# Patient Record
Sex: FEMALE | Race: WHITE | Employment: UNEMPLOYED | URBAN - METROPOLITAN AREA
[De-identification: names, ages, dates, MRNs, and addresses within clinical notes are randomized per-mention and may not be internally consistent; named-entity substitution may affect disease eponyms.]

---

## 2021-04-28 ENCOUNTER — HOSPITAL ENCOUNTER (OUTPATIENT)
Dept: PHYSICAL THERAPY | Age: 71
Discharge: HOME | End: 2021-04-28
Payer: MEDICARE

## 2021-04-28 PROCEDURE — 97162 PT EVAL MOD COMPLEX 30 MIN: CPT

## 2021-04-28 PROCEDURE — 97110 THERAPEUTIC EXERCISES: CPT

## 2021-04-28 PROCEDURE — 97530 THERAPEUTIC ACTIVITIES: CPT

## 2021-04-28 NOTE — PROGRESS NOTES
[facility]  [facility] [address]  [phone] fax  Plan of Care/ Statement of Necessity for Physical Therapy Services    Patient name: Vernelle Sicard Start of Care: 2021   Referral source: Ricky Ascencio* : 1950    Medical Diagnosis: Bilateral hip pain [M25.551, M25.552]  Bilateral shoulder pain [M25.511, M25.512]  Neck pain [M54.2]  Payor: [place] MEDICARE / Plan: VA MEDICARE PART A & B / Product Type: Medicare /  Onset Date: 21    Treatment Diagnosis: bilateral shoulder pain, bilateral hip pain, neck pain   Prior Hospitalization: see medical history Provider#: [id number]   Medications: Verified on Patient summary List    Comorbidities: hepatitis, arthritis, hx of COVID-19 infection in 2020, HTN   Prior Level of Function: functionally independent, living alone in single story home with stairs to room above garage, enjoys yoga      The Plan of Care and following information is based on the information from the initial evaluation. Assessment/ key information: Pt is a pleasant 80 y.o. female who presents with c/o bilateral hip pain, bilateral shoulder pain, and neck pain. The patient reports an intermittent history of right hip pain that was well managed until 2020 when the patient was diagnosed with COVID-19; the patient suffered from intense arthralgias during her bout with COVID-19 that have persisted and limit the patient's function at home. Signs/symptoms at eval consistent with widespread mechanical joint pain 2/2 likely degenerative changes that were exacerbated during COVID-19 onset and persist secondary to decreased activity levels, generalized weakness, and perhaps some nociplastic changes due to biopsychosocial stressors in patient's life.   Functional deficits include: impairded cervical AROM, impaired left shoulder flexion AROM, impaired functional LE strength, and impaired ambulation tolernace. Rehab potential is good due to desire to achieve PLOF. Pt would benefit from skilled PT to address above deficits to improve Pt's function and ability to return to PLOF household tasks with decreased pain and improved activity tolerance. Evaluation Complexity History HIGH Complexity :3+ comorbidities / personal factors will impact the outcome/ POC ; Examination MEDIUM Complexity : 3 Standardized tests and measures addressing body structure, function, activity limitation and / or participation in recreation  ;Presentation MEDIUM Complexity : Evolving with changing characteristics  ; Clinical Decision Making MEDIUM Complexity : FOTO score of 26-74  Overall Complexity Rating: MEDIUM  Problem List: pain affecting function, decrease ROM, decrease strength, edema affecting function, impaired gait/ balance, decrease ADL/ functional abilitiies, decrease activity tolerance, decrease flexibility/ joint mobility and decrease transfer abilities   Treatment Plan may include any combination of the following: Therapeutic exercise, Therapeutic activities, Neuromuscular re-education, Physical agent/modality, Gait/balance training, Manual therapy, Aquatic therapy, Patient education, Self Care training, Functional mobility training, Home safety training and Stair training  Patient / Family readiness to learn indicated by: asking questions  Persons(s) to be included in education: patient (P)  Barriers to Learning/Limitations: None  Patient Goal (s): to be able to walk without severe pain, to regain some strength, and to have a greater range of motion  Patient Self Reported Health Status: fair  Rehabilitation Potential: good    Short Term Goals: To be accomplished in 1 week  - Goal: Pt to be compliant with initial HEP to improve shoulder/lumbar/hip mobility to improve ease of daily tasks. Status at last note/certification: Established and reviewed with Pt  Long Term Goals:  To be accomplished in 10 treatments  - Goal: Pt to demo B cervical rotation of at least 50 degrees to improve ease of visual scanning when driving. Status at last note/certification: cervical rotation right 42 deg, left 32 deg  - Goal: Pt to demo left seated shoulder flexion of at least 150 deg to improve ease of cleaning tasks. Status at last note/certification: left seated shoulder flexion 123 deg  - Goal: Pt will perform 5x STS test in 12 seconds or less without UE assist to improve ease of transfers and demo improved functional LE strength. Status at last note/certification: 24 seconds without UE assist  - Goal: Pt to report ambulation tolerance of at least 15 minutes without increased pain to improve ease of grocery shopping. Status at last note/certification: 3-5 minute tolerance before increased hip pain  - Goal: Pt to report FOTO score of at least 50 pts to show improved function and quality of life. Status at last note/certification: FOTO 39 pts       Frequency / Duration: Patient to be seen 2-3 times per week for 10 treatments. Patient/ Caregiver education and instruction: Diagnosis, prognosis, activity modification and exercises   [x]  Plan of care has been reviewed with PTA    Certification Period: 4/28/21-5/27/21  Maria De Jesus Speaker 4/28/2021 10:13 AM  _____________________________________________________________________  I certify that the above Therapy Services are being furnished while the patient is under my care. I agree with the treatment plan and certify that this therapy is necessary.     [de-identified] Signature:____________Date:_________TIME:________     Kera Hutchins*  ** Signature, Date and Time must be completed for valid certification **    Please sign and return to [facility] JASON MARQUES [address]  [phone] fax

## 2021-04-28 NOTE — PROGRESS NOTES
PT DAILY TREATMENT NOTE 10-18    Patient Name: Scotty Ramon  Date:2021  : 1950  [x]  Patient  Verified  Payor: VA MEDICARE / Plan: VA MEDICARE PART A & B / Product Type: Medicare /    In time:9:08  Out time:10:03  Total Treatment Time (min): 55  Visit #: 1 of 10    Medicare/BCBS Only   Total Timed Codes (min):  25 1:1 Treatment Time:  55       Treatment Area: Bilateral hip pain [M25.551, M25.552]  Bilateral shoulder pain [M25.511, M25.512]  Neck pain [M54.2]    SUBJECTIVE  Pain Level (0-10 scale): 5  Any medication changes, allergies to medications, adverse drug reactions, diagnosis change, or new procedure performed?: [x] No    [] Yes (see summary sheet for update)  Subjective functional status/changes:   [] No changes reported  See POC    OBJECTIVE    30 min [x]Eval                  []Re-Eval       10 min Therapeutic Exercise:  [] See flow sheet :   Rationale: increase ROM and increase strength to improve the patients ability to perform functional reaching/lifting/bed mobility with decreased pain. 15 min Therapeutic Activity:  []  See flow sheet : Patient education on therapy assessment, prognosis, expectations for therapy sessions, patient goals, role of COVID-19 arthralgias and inactivity in generating pain, focus of therapy on function and global strengthening/mobility, and HEP. Rationale: to improve the patients ability to adhere to HEP and therapy sessions for increased compliance when working toward therapy goals.             With   [] TE   [x] TA   [] neuro   [] other: Patient Education: [x] Review HEP    [] Progressed/Changed HEP based on:   [] positioning   [] body mechanics   [] transfers   [] heat/ice application    [] other:      Other Objective/Functional Measures: See POC     Pain Level (0-10 scale) post treatment: 5    ASSESSMENT/Changes in Function: See POC    Patient will continue to benefit from skilled PT services to modify and progress therapeutic interventions, address functional mobility deficits, address ROM deficits, address strength deficits, analyze and address soft tissue restrictions, analyze and cue movement patterns, analyze and modify body mechanics/ergonomics, assess and modify postural abnormalities, address imbalance/dizziness and instruct in home and community integration to attain remaining goals. [x]  See Plan of Care  []  See progress note/recertification  []  See Discharge Summary         Progress towards goals / Updated goals:  See POC    PLAN  [x]  Upgrade activities as tolerated     []  Continue plan of care  [x]  Update interventions per flow sheet       []  Discharge due to:_  []  Other:_      Leatha Old 4/28/2021  10:10 AM    No future appointments.

## 2021-05-04 ENCOUNTER — HOSPITAL ENCOUNTER (OUTPATIENT)
Dept: PHYSICAL THERAPY | Age: 71
Discharge: HOME | End: 2021-05-04
Payer: MEDICARE

## 2021-05-04 PROCEDURE — 97530 THERAPEUTIC ACTIVITIES: CPT

## 2021-05-04 PROCEDURE — 97110 THERAPEUTIC EXERCISES: CPT

## 2021-05-04 NOTE — PROGRESS NOTES
PT DAILY TREATMENT NOTE 10-18    Patient Name: Georgina Clayton  Date:2021  : 1950  [x]  Patient  Verified  Payor: Gerhardt Hinders / Plan: VA MEDICARE PART A & B / Product Type: Medicare /    In time:9:45  Out time:10:33  Total Treatment Time (min): 48  Visit #: 2 of 10    Medicare/BCBS Only   Total Timed Codes (min):  48 1:1 Treatment Time:  43       Treatment Area: Bilateral hip pain [M25.551, M25.552]  Bilateral shoulder pain [M25.511, M25.512]  Neck pain [M54.2]    SUBJECTIVE  Pain Level (0-10 scale): 4  Any medication changes, allergies to medications, adverse drug reactions, diagnosis change, or new procedure performed?: [x] No    [] Yes (see summary sheet for update)  Subjective functional status/changes:   [] No changes reported  \"I have been doing my exercises religiously at home but I am also dealing with a chronic UTI so I was at the doctor yesterday and not able to work on those as much. \"    OBJECTIVE    38 min Therapeutic Exercise:  [x] See flow sheet :   Rationale: increase ROM and increase strength to improve the patients ability to perform ADLs with improved shoulder/elbow/hip/knee strength and improved cervical/lumbar mobility. 10 min Therapeutic Activity:  [x]  See flow sheet :   Rationale: increase ROM, increase strength, improve coordination, improve balance, and increase proprioception  to improve the patients ability to perform transfers, stair negotiation, and functional overhead/forward/floor<>waist lifts.   Patient education: HEP review     With   [x] TE   [] TA   [] neuro   [] other: Patient Education: [x] Review HEP    [] Progressed/Changed HEP based on:   [] positioning   [] body mechanics   [] transfers   [] heat/ice application    [] other:      Other Objective/Functional Measures: -Initiated treatment per flowsheet  - bpm following standing exercises     Pain Level (0-10 scale) post treatment: 0    ASSESSMENT/Changes in Function: The patient requires moderate verbal/visual cuing for correct technique with today's interventions. Able to tolerate all interventions without increased pain though pt notes fatigue following interventions, especially standing exercises. She is encouraged by her ability to complete today's interventions. Patient will continue to benefit from skilled PT services to modify and progress therapeutic interventions, address functional mobility deficits, address ROM deficits, address strength deficits, analyze and address soft tissue restrictions, analyze and cue movement patterns, analyze and modify body mechanics/ergonomics, assess and modify postural abnormalities, address imbalance/dizziness and instruct in home and community integration to attain remaining goals. []  See Plan of Care  []  See progress note/recertification  []  See Discharge Summary         Progress towards goals / Updated goals:  Short Term Goals: To be accomplished in 1 week  - Goal: Pt to be compliant with initial HEP to improve shoulder/lumbar/hip mobility to improve ease of daily tasks. Status at last note/certification: Established and reviewed with Pt  Current: met, pt reports daily compliance  Long Term Goals: To be accomplished in 10 treatments  - Goal: Pt to demo B cervical rotation of at least 50 degrees to improve ease of visual scanning when driving. Status at last note/certification: cervical rotation right 42 deg, left 32 deg  Current:            - Goal: Pt to demo left seated shoulder flexion of at least 150 deg to improve ease of cleaning tasks. Status at last note/certification: left seated shoulder flexion 123 deg  Current:            - Goal: Pt will perform 5x STS test in 12 seconds or less without UE assist to improve ease of transfers and demo improved functional LE strength.   Status at last note/certification: 24 seconds without UE assist  Current:            - Goal: Pt to report ambulation tolerance of at least 15 minutes without increased pain to improve ease of grocery shopping. Status at last note/certification: 3-5 minute tolerance before increased hip pain  Current:            - Goal: Pt to report FOTO score of at least 50 pts to show improved function and quality of life.   Status at last note/certification: FOTO 39 pts   Current: reassess at MD note    PLAN  [x]  Upgrade activities as tolerated     [x]  Continue plan of care  []  Update interventions per flow sheet       []  Discharge due to:_  []  Other:_      Marion Bishop 5/4/2021  7:23 AM    Future Appointments   Date Time Provider Davis Olivas   5/4/2021  9:45 AM [facility] [address]   5/7/2021  9:00 AM August Osullivan, [facility] [address]   5/12/2021  9:00 AM August Osullivan, [facility] [address]   5/14/2021  9:00 AM August Osullivan, [facility] [address]   5/18/2021  9:45 AM [facility] [address]   5/21/2021  9:00 AM [facility] [address]   5/25/2021  9:45 AM [facility] [address]   5/28/2021  9:45 [facility] [address]

## 2021-05-07 ENCOUNTER — HOSPITAL ENCOUNTER (OUTPATIENT)
Dept: PHYSICAL THERAPY | Age: 71
Discharge: HOME | End: 2021-05-07
Payer: MEDICARE

## 2021-05-07 PROCEDURE — 97112 NEUROMUSCULAR REEDUCATION: CPT

## 2021-05-07 PROCEDURE — 97110 THERAPEUTIC EXERCISES: CPT

## 2021-05-07 NOTE — PROGRESS NOTES
PT DAILY TREATMENT NOTE     Patient Name: Franky Ochoa  Date:2021  : 1950  [x]  Patient  Verified  Payor: VA MEDICARE / Plan: VA MEDICARE PART A & B / Product Type: Medicare /    In time:9:00  Out time:9:53  Total Treatment Time (min): 53  Visit #: 3 of 10    Medicare/BCBS Only   Total Timed Codes (min):  53 1:1 Treatment Time:  38       Treatment Area: Bilateral hip pain [M25.551, M25.552]  Bilateral shoulder pain [M25.511, M25.512]  Neck pain [M54.2]    SUBJECTIVE  Pain Level (0-10 scale): 4/10  Any medication changes, allergies to medications, adverse drug reactions, diagnosis change, or new procedure performed?: [x] No    [] Yes (see summary sheet for update)  Subjective functional status/changes:   [] No changes reported  \"I was a little sore after the first session but not as much as I thought. I'm very tired. \"    OBJECTIVE    43 min Therapeutic Exercise:  [] See flow sheet :   Rationale: increase ROM and increase strength to improve the patients ability to improve B shoulder mobility, LE strength for ease of ADLs, standing activities     10 min Neuromuscular Re-education:  []  See flow sheet :   Rationale: increase strength and improve coordination  to improve the patients ability to improve hip strength and stability for increased standing/amb tolerance, improved standing balance          With   [] TE   [] TA   [] neuro   [] other: Patient Education: [x] Review HEP    [] Progressed/Changed HEP based on:   [] positioning   [] body mechanics   [] transfers   [] heat/ice application    [] other:      Other Objective/Functional Measures: Continued with exercises per flow sheet. Pain Level (0-10 scale) post treatment: 5/10 \"sore\"    ASSESSMENT/Changes in Function: Pt gives good effort during therapy sessions and was able to perform all exercise interventions without increased pain overall.   Pt most challenged with theraband Y's and S/L open books but ROM continued to improve with each repetition. Pt noted muscle soreness at end of session only. Patient will continue to benefit from skilled PT services to address functional mobility deficits, address ROM deficits, address strength deficits, analyze and address soft tissue restrictions, analyze and cue movement patterns, analyze and modify body mechanics/ergonomics, assess and modify postural abnormalities and instruct in home and community integration to attain remaining goals. []  See Plan of Care  []  See progress note/recertification  []  See Discharge Summary         Progress towards goals / Updated goals:  Short Term Goals: To be accomplished in 1 week  - Goal: Pt to be compliant with initial HEP to improve shoulder/lumbar/hip mobility to improve ease of daily tasks. Status at last note/certification: Established and reviewed with Pt  Current: met, pt reports daily compliance  Long Term Goals: To be accomplished in 10 treatments  - Goal: Pt to demo B cervical rotation of at least 50 degrees to improve ease of visual scanning when driving. Status at last note/certification: cervical rotation right 42 deg, left 32 deg  Current:            - Goal: Pt to demo left seated shoulder flexion of at least 150 deg to improve ease of cleaning tasks.   Status at last note/certification: left seated shoulder flexion 123 deg  Current:            - Goal: Pt will perform 5x STS test in 12 seconds or less without UE assist to improve ease of transfers and demo improved functional LE strength. Status at last note/certification: 24 seconds without UE assist  Current:            - Goal: Pt to report ambulation tolerance of at least 15 minutes without increased pain to improve ease of grocery shopping. Status at last note/certification: 3-5 minute tolerance before increased hip pain  Current:            - Goal: Pt to report FOTO score of at least 50 pts to show improved function and quality of life.   Status at last note/certification: FOTO 39 pts   Current: reassess at MD note    PLAN  [x]  Upgrade activities as tolerated     [x]  Continue plan of care  []  Update interventions per flow sheet       []  Discharge due to:_  []  Other:_      Sarbjit Osullivan, PT 5/7/2021  9:19 AM    Future Appointments   Date Time Provider Davis Olivas   5/12/2021  9:00 AM Sarbjit Osullivan, PT [facility]   5/14/2021  9:00 AM Sarbjit Osullivan, PT [facility]   5/18/2021  9:45 AM [facility]   5/21/2021  [facility]   5/25/2021  9:45 AM [facility]   5/28/2021  9:45 AM Roland Bennett, [facility]

## 2021-05-12 ENCOUNTER — HOSPITAL ENCOUNTER (OUTPATIENT)
Dept: PHYSICAL THERAPY | Age: 71
Discharge: HOME | End: 2021-05-12
Payer: MEDICARE

## 2021-05-12 PROCEDURE — 97110 THERAPEUTIC EXERCISES: CPT

## 2021-05-12 PROCEDURE — 97112 NEUROMUSCULAR REEDUCATION: CPT

## 2021-05-12 NOTE — PROGRESS NOTES
PT DAILY TREATMENT NOTE     Patient Name: Vernelle Sicard  Date:2021  : 1950  [x]  Patient  Verified  Payor: VA MEDICARE / Plan: VA MEDICARE PART A & B / Product Type: Medicare /    In time:09:02 Out time:10:00  Total Treatment Time (min): 58  Visit #: 4 of 10    Medicare/BCBS Only   Total Timed Codes (min):  58 1:1 Treatment Time:  56       Treatment Area: Bilateral hip pain [M25.551, M25.552]  Bilateral shoulder pain [M25.511, M25.512]  Neck pain [M54.2]    SUBJECTIVE  Pain Level (0-10 scale): 5/10  Any medication changes, allergies to medications, adverse drug reactions, diagnosis change, or new procedure performed?: [x] No    [] Yes (see summary sheet for update)  Subjective functional status/changes:   [] No changes reported  \"A little better today\" \"Left shoulder and legs were hurting really bad\"    OBJECTIVE    30 min Therapeutic Exercise:  [x] See flow sheet :   Rationale: increase ROM and increase strength to improve the patients ability to improve B shoulder mobility, LE strength for ease of ADLs, standing activities     28/ (1:1) min Neuromuscular Re-education:  [x]  See flow sheet :   Rationale: increase strength and improve coordination  to improve the patients ability to improve hip strength and stability for increased standing/amb tolerance, improved standing balance          With   [x] TE   [] TA   [] neuro   [] other: Patient Education: [x] Review HEP    [] Progressed/Changed HEP based on:   [] positioning   [] body mechanics   [] transfers   [] heat/ice application    [x] other: updated HEP, GTB, and OTB     Other Objective/Functional Measures: Continued with exercises per flow sheet. Pain Level (0-10 scale) post treatment: 3    ASSESSMENT/Changes in Function: Pt tolerated exercises and repetition progressions without increased pain or discomfort.  Provided with and discussed updated HEP with therex performed in PT session and GTB and OTB for HEP; verbalized and demonstrates understanding. Skilled cues provided for proper hip hinge with mini squats at parallel bars. Patient will continue to benefit from skilled PT services to address functional mobility deficits, address ROM deficits, address strength deficits, analyze and address soft tissue restrictions, analyze and cue movement patterns, analyze and modify body mechanics/ergonomics, assess and modify postural abnormalities and instruct in home and community integration to attain remaining goals. []  See Plan of Care  []  See progress note/recertification  []  See Discharge Summary         Progress towards goals / Updated goals:  Short Term Goals: To be accomplished in 1 week  - Goal: Pt to be compliant with initial HEP to improve shoulder/lumbar/hip mobility to improve ease of daily tasks. Status at last note/certification: Established and reviewed with Pt  Current: met, pt reports daily compliance  Long Term Goals: To be accomplished in 10 treatments  - Goal: Pt to demo B cervical rotation of at least 50 degrees to improve ease of visual scanning when driving. Status at last note/certification: cervical rotation right 42 deg, left 32 deg  Current:            - Goal: Pt to demo left seated shoulder flexion of at least 150 deg to improve ease of cleaning tasks.   Status at last note/certification: left seated shoulder flexion 123 deg  Current:            - Goal: Pt will perform 5x STS test in 12 seconds or less without UE assist to improve ease of transfers and demo improved functional LE strength. Status at last note/certification: 24 seconds without UE assist  Current:            - Goal: Pt to report ambulation tolerance of at least 15 minutes without increased pain to improve ease of grocery shopping.   Status at last note/certification: 3-5 minute tolerance before increased hip pain  Current: 1 block away when it starts to hurt; progressing            - Goal: Pt to report FOTO score of at least 50 pts to show improved function and quality of life.   Status at last note/certification: FDYV 59 JCV   Current: reassess at MD note    PLAN  [x]  Upgrade activities as tolerated     [x]  Continue plan of care  []  Update interventions per flow sheet       []  Discharge due to:_  []  Other:_      Bibi Silveira, PT 5/12/2021 1004 am    Future Appointments   Date Time Provider Davis Olivas   5/14/2021  9:00 AM Anali Osullivan, [facility]   5/18/2021  9:45 AM Shelby Mettle [facility]   5/21/2021  9:00 AM Lenora Rodgers [facility]   5/25/2021  9:45 AM [facility]   5/28/2021  9:45 AM Doni Read, PT [facility]

## 2021-05-14 ENCOUNTER — HOSPITAL ENCOUNTER (OUTPATIENT)
Dept: PHYSICAL THERAPY | Age: 71
Discharge: HOME | End: 2021-05-14
Payer: MEDICARE

## 2021-05-14 PROCEDURE — 97112 NEUROMUSCULAR REEDUCATION: CPT

## 2021-05-14 PROCEDURE — 97110 THERAPEUTIC EXERCISES: CPT

## 2021-05-14 NOTE — PROGRESS NOTES
PT DAILY TREATMENT NOTE     Patient Name: Franky Ochoa  Date:2021  : 1950  [x]  Patient  Verified  Payor: [place] MED[place]ARE / Plan: [place] MEDICARE PART A & B / Product Type: Medicare /    In time:9:06 Out time:9:57  Total Treatment Time (min):51   Visit #: 5 of 10    Medicare/BCBS Only   Total Timed Codes (min):  41 1:1 Treatment Time:  41       Treatment Area: Bilateral hip pain [M25.551, M25.552]  Bilateral shoulder pain [M25.511, M25.512]  Neck pain [M54.2]    SUBJECTIVE  Pain Level (0-10 scale): 4/10  Any medication changes, allergies to medications, adverse drug reactions, diagnosis change, or new procedure performed?: [x] No    [] Yes (see summary sheet for update)  Subjective functional status/changes:   [] No changes reported  \"I feel it in my left shoulder and legs. \"    OBJECTIVE    Modality rationale: decrease pain and increase tissue extensibility to improve the patients ability to increase ease of ADLs   Min Type Additional Details    [] Estim:  []Unatt       []IFC  []Premod                        []Other:  []w/ice   []w/heat  Position:  Location:    [] Estim: []Att    []TENS instruct  []NMES                    []Other:  []w/US   []w/ice   []w/heat  Position:  Location:    []  Traction: [] Cervical       []Lumbar                       [] Prone          []Supine                       []Intermittent   []Continuous Lbs:  [] before manual  [] after manual    []  Ultrasound: []Continuous   [] Pulsed                           []1MHz   []3MHz Location:  W/cm2:    []  Iontophoresis with dexamethasone         Location: [] Take home patch   [] In clinic   10 []  Ice     [x]  heat  []  Ice massage  []  Laser   []  Anodyne Position: supine  Location: left shoulder    []  Laser with stim  []  Other: Position:  Location:    []  Vasopneumatic Device Pressure:       [] lo [] med [] hi   Temperature: [] lo [] med [] hi   [x] Skin assessment post-treatment:  [x]intact []redness- no adverse reaction []redness  adverse reaction:     26 min Therapeutic Exercise:  [x] See flow sheet :   Rationale: increase ROM and increase strength to improve the patients ability to improve B shoulder mobility, LE strength for ease of ADLs, standing activities     15 min Neuromuscular Re-education:  [x]  See flow sheet :   Rationale: increase strength and improve coordination  to improve the patients ability to improve hip strength and stability for increased standing/amb tolerance, improved standing balance          With   [x] TE   [] TA   [] neuro   [] other: Patient Education: [x] Review HEP    [] Progressed/Changed HEP based on:   [] positioning   [] body mechanics   [] transfers   [] heat/ice application    [x] other: updated HEP, GTB, and OTB     Other Objective/Functional Measures: Continued with exercises per flow sheet. Pain Level (0-10 scale) post treatment: 1/10    ASSESSMENT/Changes in Function: Pt a little more fatigued today with exercises, needing short breaks after each exercise. Pt at end of session noted pushing left shoulder a little harder with exercises today as she is wanting to get better faster. She didn't complain of any increased pain during session. Applied MHP to assist with reduction in left shoulder irritation. Pt noted decrease in symptoms. Pt advised to not pace self with exercises and to not over-exert herself. Pt reported understanding. Patient will continue to benefit from skilled PT services to address functional mobility deficits, address ROM deficits, address strength deficits, analyze and address soft tissue restrictions, analyze and cue movement patterns, analyze and modify body mechanics/ergonomics, assess and modify postural abnormalities and instruct in home and community integration to attain remaining goals. []  See Plan of Care  []  See progress note/recertification  []  See Discharge Summary         Progress towards goals / Updated goals:  Short Term Goals:  To be accomplished in 1 week  - Goal: Pt to be compliant with initial HEP to improve shoulder/lumbar/hip mobility to improve ease of daily tasks. Status at last note/certification: Established and reviewed with Pt  Current: met, pt reports daily compliance  Long Term Goals: To be accomplished in 10 treatments  - Goal: Pt to demo B cervical rotation of at least 50 degrees to improve ease of visual scanning when driving. Status at last note/certification: cervical rotation right 42 deg, left 32 deg  Current: reassess next visit    - Goal: Pt to demo left seated shoulder flexion of at least 150 deg to improve ease of cleaning tasks.   Status at last note/certification: left seated shoulder flexion 123 deg  Current: progressing - raises arm with wand to ~140 deg but with some discomfort           - Goal: Pt will perform 5x STS test in 12 seconds or less without UE assist to improve ease of transfers and demo improved functional LE strength. Status at last note/certification: 24 seconds without UE assist  Current: reassess next visit     - Goal: Pt to report ambulation tolerance of at least 15 minutes without increased pain to improve ease of grocery shopping. Status at last note/certification: 3-5 minute tolerance before increased hip pain  Current: progressing - one block away when it starts to hurt; progressing            - Goal: Pt to report FOTO score of at least 50 pts to show improved function and quality of life.   Status at last note/certification: CQXA 41 WCU   Current: reassess at MD note    PLAN  [x]  Upgrade activities as tolerated     [x]  Continue plan of care  []  Update interventions per flow sheet       []  Discharge due to:_  []  Other:_      Shelly Osullivan, PT 5/14/2021 1004 am    Future Appointments   Date Time Provider Davis Olivas   5/18/2021  9:45 AM Juancarlos Meraz [facility]   5/21/2021  9:00 AM Geoffrey Reynolds [facility]   5/25/2021  9:45 AM Jose Byrd 5/28/2021  9:45 AM Max Schulte, PT [facility]

## 2021-05-18 ENCOUNTER — HOSPITAL ENCOUNTER (OUTPATIENT)
Dept: PHYSICAL THERAPY | Age: 71
Discharge: HOME | End: 2021-05-18
Payer: MEDICARE

## 2021-05-18 PROCEDURE — 97530 THERAPEUTIC ACTIVITIES: CPT

## 2021-05-18 PROCEDURE — 97110 THERAPEUTIC EXERCISES: CPT

## 2021-05-18 NOTE — PROGRESS NOTES
PT DAILY TREATMENT NOTE 10-18    Patient Name: Ivan García  Date:2021  : 1950  [x]  Patient  Verified  Payor: Christopher Schwartz / Plan: [place] MEDICARE PART A & B / Product Type: Medicare /    In time:9:45  Out time:10:45  Total Treatment Time (min): 60  Visit #: 6 of 10    Medicare/BCBS Only   Total Timed Codes (min):  50 1:1 Treatment Time:  45       Treatment Area: Bilateral hip pain [M25.551, M25.552]  Bilateral shoulder pain [M25.511, M25.512]  Neck pain [M54.2]    SUBJECTIVE  Pain Level (0-10 scale): 5  Any medication changes, allergies to medications, adverse drug reactions, diagnosis change, or new procedure performed?: [x] No    [] Yes (see summary sheet for update)  Subjective functional status/changes:   [] No changes reported  \"I am having an easier time reaching straight up at home but it is still difficult to reach sideways. I am having some increased pain in my upper right hip today as well. \"    OBJECTIVE    Modality rationale: decrease pain to improve the patients ability to relax and sleep following therapy session to improve restorative sleep patterns.     Min Type Additional Details    [x] Estim:  []Unatt       []IFC  []Premod                        []Other:  []w/ice   []w/heat  Position:   Location:     [] Estim: []Att    []TENS instruct  []NMES                    []Other:  []w/US   []w/ice   []w/heat  Position:  Location:    []  Traction: [] Cervical       []Lumbar                       [] Prone          []Supine                       []Intermittent   []Continuous Lbs:  [] before manual  [] after manual    []  Ultrasound: []Continuous   [] Pulsed                           []1MHz   []3MHz W/cm2:  Location:    []  Iontophoresis with dexamethasone         Location: [] Take home patch   [] In clinic   10 []  Ice     [x]  heat  []  Ice massage  []  Laser   []  Anodyne Position: left sidelying  Location: right hip    []  Laser with stim  []  Other:  Position:  Location:    [] Vasopneumatic Device Pressure:       [] lo [] med [] hi   Temperature: [] lo [] med [] hi   [x] Skin assessment post-treatment:  [x]intact []redness- no adverse reaction    []redness  adverse reaction:       35 min Therapeutic Exercise:  [x]? See flow sheet :   Rationale: increase ROM and increase strength to improve the patients ability to perform ADLs with improved shoulder/elbow/hip/knee strength and improved cervical/lumbar mobility.    15 min Therapeutic Activity:  [x]? See flow sheet :   Rationale: increase ROM, increase strength, improve coordination, improve balance, and increase proprioception  to improve the patients ability to perform transfers, stair negotiation, and functional overhead/forward/floor<>waist lifts. Patient education: squat technique    With   [] TE   [] TA   [] neuro   [] other: Patient Education: [x] Review HEP    [] Progressed/Changed HEP based on:   [] positioning   [] body mechanics   [] transfers   [] heat/ice application    [] other:      Other Objective/Functional Measures: Seated left GHJ flexion 146 deg     Pain Level (0-10 scale) post treatment: 3    ASSESSMENT/Changes in Function: Patient requires verbal/visual cues to improve posterior hip translation and increased weight shift to heels with mini squats. Pt reporting increased fatigue in right abdominal oblique musculature that is likely secondary to compensation during gait due to weak right hip abductors. Pt making good progress toward improved ease of transfers and overhead reaching abilities.     Patient will continue to benefit from skilled PT services to modify and progress therapeutic interventions, address functional mobility deficits, address ROM deficits, address strength deficits, analyze and address soft tissue restrictions, analyze and cue movement patterns, analyze and modify body mechanics/ergonomics, assess and modify postural abnormalities, address imbalance/dizziness and instruct in home and community integration to attain remaining goals. []  See Plan of Care  []  See progress note/recertification  []  See Discharge Summary         Progress towards goals / Updated goals:  Short Term Goals: To be accomplished in 1 week  - Goal: Pt to be compliant with initial HEP to improve shoulder/lumbar/hip mobility to improve ease of daily tasks. Status at last note/certification: Established and reviewed with Pt  Current: met, pt reports daily compliance  Long Term Goals: To be accomplished in 10 treatments  - Goal: Pt to demo B cervical rotation of at least 50 degrees to improve ease of visual scanning when driving. Status at last note/certification: cervical rotation right 42 deg, left 32 deg  Current: reassess next visit    - Goal: Pt to demo left seated shoulder flexion of at least 150 deg to improve ease of cleaning tasks.   Status at last note/certification: left seated shoulder flexion 123 deg  Current: progressing, 146 degrees with minimal pain at end range (5/18/21)      - Goal: Pt will perform 5x STS test in 12 seconds or less without UE assist to improve ease of transfers and demo improved functional LE strength. Status at last note/certification: 24 seconds without UE assist  Current: progressing, 13 seconds without UE assist (5/18/21)    - Goal: Pt to report ambulation tolerance of at least 15 minutes without increased pain to improve ease of grocery shopping. Status at last note/certification: 3-5 minute tolerance before increased hip pain  Current: progressing - one block away when it starts to hurt; progressing            - Goal: Pt to report FOTO score of at least 50 pts to show improved function and quality of life.   Status at last note/certification: ZUMV 17 SBA   Current: reassess at MD note    PLAN  [x]  Upgrade activities as tolerated     [x]  Continue plan of care  []  Update interventions per flow sheet       []  Discharge due to:_  []  Other:_      Vargas Latosha 5/18/2021  7:42 AM    Future Appointments   Date Time Provider Department Center   5/18/2021  9:45 AM [facility]   5/21/2021  9:00 AM [facility]   5/25/2021  9:45 AM [facility]   5/28/2021  9:45 AM ADI Ta

## 2021-05-21 ENCOUNTER — HOSPITAL ENCOUNTER (OUTPATIENT)
Dept: PHYSICAL THERAPY | Age: 71
Discharge: HOME | End: 2021-05-21
Payer: MEDICARE

## 2021-05-21 PROCEDURE — 97110 THERAPEUTIC EXERCISES: CPT

## 2021-05-21 PROCEDURE — 97530 THERAPEUTIC ACTIVITIES: CPT

## 2021-05-21 NOTE — PROGRESS NOTES
PT DAILY TREATMENT NOTE 10-18    Patient Name: Ivan García  Date:2021  : 1950  [x]  Patient  Verified  Payor: [place] MEDICARE / Plan: [place] MEDICARE PART A & B / Product Type: Medicare /    In time:9:48  Out time:10:31  Total Treatment Time (min): 43  Visit #: 7 of 10    Medicare/BCBS Only   Total Timed Codes (min):  43 1:1 Treatment Time:  40       Treatment Area: Bilateral hip pain [M25.551, M25.552]  Bilateral shoulder pain [M25.511, M25.512]  Neck pain [M54.2]    SUBJECTIVE  Pain Level (0-10 scale): 5  Any medication changes, allergies to medications, adverse drug reactions, diagnosis change, or new procedure performed?: [x] No    [] Yes (see summary sheet for update)  Subjective functional status/changes:   [] No changes reported  \"I was pretty sore Wednesday and yesterday but I am feeling better today. \"    OBJECTIVE    31 min Therapeutic Exercise:  [x]? ? See flow sheet :   Rationale: increase ROM and increase strength to improve the patients ability to perform ADLs with improved shoulder/elbow/hip/knee strength and improved cervical/lumbar mobility.     12 min Therapeutic Activity:  [x]? ?  See flow sheet :   Rationale: increase ROM, increase strength, improve coordination, improve balance, and increase proprioception  to improve the patients ability to perform transfers, stair negotiation, and functional overhead/forward/floor<>waist lifts.   Patient education: squat technique           With   [x] TE   [x] TA   [] neuro   [] other: Patient Education: [x] Review HEP    [] Progressed/Changed HEP based on:   [] positioning   [] body mechanics   [] transfers   [] heat/ice application    [] other:      Other Objective/Functional Measures: -Added standing march with weight to practice donning/doffing pants     Pain Level (0-10 scale) post treatment: 4    ASSESSMENT/Changes in Function: Patient's reports of increased discomfort following previous session consistent with delayed onset muscle soreness. One of her main therapy goals at this time is to be able to don/doff her pants without sitting down. Patient will continue to benefit from skilled PT services to modify and progress therapeutic interventions, address functional mobility deficits, address ROM deficits, address strength deficits, analyze and address soft tissue restrictions, analyze and cue movement patterns, analyze and modify body mechanics/ergonomics, assess and modify postural abnormalities, address imbalance/dizziness and instruct in home and community integration to attain remaining goals. []  See Plan of Care  []  See progress note/recertification  []  See Discharge Summary         Progress towards goals / Updated goals:  Short Term Goals: To be accomplished in 1 week  - Goal: Pt to be compliant with initial HEP to improve shoulder/lumbar/hip mobility to improve ease of daily tasks. Status at last note/certification: Established and reviewed with Pt  Current: met, pt reports daily compliance  Long Term Goals: To be accomplished in 10 treatments  - Goal: Pt to demo B cervical rotation of at least 50 degrees to improve ease of visual scanning when driving. Status at last note/certification: cervical rotation right 42 deg, left 32 deg  Current: progressing, right rotation 62 deg left rotation 42 deg,   - Goal: Pt to demo left seated shoulder flexion of at least 150 deg to improve ease of cleaning tasks.   Status at last note/certification: left seated shoulder flexion 123 deg  Current: progressing, 146 degrees with minimal pain at end range (5/18/21)      - Goal: Pt will perform 5x STS test in 12 seconds or less without UE assist to improve ease of transfers and demo improved functional LE strength.   Status at last note/certification: 24 seconds without UE assist  Current: progressing, 13 seconds without UE assist (5/18/21)    - Goal: Pt to report ambulation tolerance of at least 15 minutes without increased pain to improve ease of grocery shopping. Status at last note/certification: 3-5 minute tolerance before increased hip pain  Current: progressing - one block away when it starts to hurt; progressing            - Goal: Pt to report FOTO score of at least 50 pts to show improved function and quality of life.   Status at last note/certification: AUNP 86 XWB   Current: reassess at MD note    PLAN  [x]  Upgrade activities as tolerated     [x]  Continue plan of care  []  Update interventions per flow sheet       []  Discharge due to:_  []  Other:_      Inge Camarena 5/21/2021  8:14 AM    Future Appointments   Date Time Provider Davis Olivas   5/21/2021  9:45 AM Osiris Colin [facility]   5/25/2021  9:45 AM José Cantu   5/28/2021  9:45 AM Rosalba Burrows, PT [facility]

## 2021-05-25 ENCOUNTER — HOSPITAL ENCOUNTER (OUTPATIENT)
Dept: PHYSICAL THERAPY | Age: 71
Discharge: HOME | End: 2021-05-25
Payer: MEDICARE

## 2021-05-25 PROCEDURE — 97110 THERAPEUTIC EXERCISES: CPT

## 2021-05-25 PROCEDURE — 97112 NEUROMUSCULAR REEDUCATION: CPT

## 2021-05-25 PROCEDURE — 97530 THERAPEUTIC ACTIVITIES: CPT

## 2021-05-25 NOTE — PROGRESS NOTES
PT DAILY TREATMENT NOTE     Patient Name: Kalia Brownlee  Date:2021  : 1950  [x]  Patient  Verified  Payor: Radha James / Plan: [place] MEDICARE PART A & B / Product Type: Medicare /    In time:945  Out time:1030  Total Treatment Time (min): 45  Visit #: 8 of 10    Medicare/BCBS Only   Total Timed Codes (min):  45 1:1 Treatment Time:  45       Treatment Area: Bilateral hip pain [M25.551, M25.552]  Bilateral shoulder pain [M25.511, M25.512]  Neck pain [M54.2]    SUBJECTIVE  Pain Level (0-10 scale): 5  Any medication changes, allergies to medications, adverse drug reactions, diagnosis change, or new procedure performed?: [x] No    [] Yes (see summary sheet for update)  Subjective functional status/changes:   [] No changes reported  \"My left shoulder has been hurting me more. \"    OBJECTIVE    Modality rationale: patient declined   Min Type Additional Details    [] Estim:  []Unatt       []IFC  []Premod                        []Other:  []w/ice   []w/heat  Position:  Location:    [] Estim: []Att    []TENS instruct  []NMES                    []Other:  []w/US   []w/ice   []w/heat  Position:  Location:    []  Traction: [] Cervical       []Lumbar                       [] Prone          []Supine                       []Intermittent   []Continuous Lbs:  [] before manual  [] after manual    []  Ultrasound: []Continuous   [] Pulsed                           []1MHz   []3MHz W/cm2:  Location:    []  Iontophoresis with dexamethasone         Location: [] Take home patch   [] In clinic    []  Ice     []  heat  []  Ice massage  []  Laser   []  Anodyne Position:  Location:    []  Laser with stim  []  Other:  Position:  Location:    []  Vasopneumatic Device Pressure:       [] lo [] med [] hi   Temperature: [] lo [] med [] hi   [] Skin assessment post-treatment:  []intact []redness- no adverse reaction    []redness  adverse reaction:     15 min Therapeutic Exercise:  [x] See flow sheet :   Rationale: increase ROM and increase strength to improve the patients ability to perform ADLs    20 min Therapeutic Activity:  [x]  See flow sheet : functional reaching activities, squatting mechanics, FOTO, reassessment     Rationale: increase ROM, increase strength, improve coordination, improve balance and increase proprioception  to improve the patients ability to improve mobility and reaching      10 min Neuromuscular Re-education:  [x]  See flow sheet : scap re-ed activities, hip/glut re-ed activities    Rationale: increase ROM, increase strength, improve coordination, improve balance and increase proprioception  to improve the patients ability to improve mobility, stance stability, gait, and reaching        With   [x] TE   [x] TA   [x] neuro   [] other: Patient Education: [x] Review HEP    [] Progressed/Changed HEP based on:   [x] positioning   [x] body mechanics   [] transfers   [] heat/ice application    [] other:      Other Objective/Functional Measures:   FOTO 47    AROM right c/s rotation 63 deg  AROM left c/s rotation 60 deg    AROM left shoulder flexion seated 140 deg    5x sit to stand: 12 seconds     Pain Level (0-10 scale) post treatment: 4    ASSESSMENT/Changes in Function: Ms. Alfredito Faye reports 25% improvement since beginning therapy. Pt demonstrates increased AROM cervical rotation and left shoulder flexion. Still lacking end range shoulder flexion on the left and reports recent increase in pain with abduction. Demonstrates good squatting mechanics. Reports an increase in activity tolerance, but limited to up to 10 minutes of walking tolerance prior to onset of pain. We will continue with PT to address her remaining functional deficits.      Patient will continue to benefit from skilled PT services to modify and progress therapeutic interventions, address functional mobility deficits, address ROM deficits, address strength deficits, analyze and address soft tissue restrictions, analyze and cue movement patterns, analyze and modify body mechanics/ergonomics, assess and modify postural abnormalities, address imbalance/dizziness and instruct in home and community integration to attain remaining goals. [x]  See Plan of Care  [x]  See progress note/recertification  []  See Discharge Summary         Progress towards goals / Updated goals:  Short Term Goals: To be accomplished in 1 week  - Goal: Pt to be compliant with initial HEP to improve shoulder/lumbar/hip mobility to improve ease of daily tasks. Status at last note/certification: Established and reviewed with Pt   MET; reports daily compliance  Long Term Goals: To be accomplished in 10 treatments  - Goal: Pt to demo B cervical rotation of at least 50 degrees to improve ease of visual scanning when driving. Status at last note/certification: cervical rotation right 42 deg, left 32 deg   MET; right 63 deg, left 60 deg   - Goal: Pt to demo left seated shoulder flexion of at least 150 deg to improve ease of cleaning tasks.    Status at last note/certification: left seated shoulder flexion 123 deg   PROGRESSING; 140 deg left seated shoulder flexion     - Goal: Pt will perform 5x STS test in 12 seconds or less without UE assist to improve ease of transfers and demo improved functional LE strength. Status at last note/certification: 24 seconds without UE assist   MET; 12 seconds  - Goal: Pt to report ambulation tolerance of at least 15 minutes without increased pain to improve ease of grocery shopping. Status at last note/certification: 3-5 minute tolerance before increased hip pain   PROGRESSING; 10 minutes prior to pain            - Goal: Pt to report FOTO score of at least 50 pts to show improved function and quality of life.    Status at last note/certification: UEWX 49 TMY    PROGRESSING; 47 deg     Functional Gains: activity tolerance, ease with stair negotiation, squatting, getting off the floor  Functional Deficits: fluctuating pain, stairs, dressing, walking/standing tolerance, reaching behind self with left shoulder, cooking  % improvement: 25%  Pain   Average: 5/10       Best: 3/10     Worst: 8-9/10  Patient Goal: \"be able [place] walk [place] the airport and get through the gate without having to use a wheelchair\"    PLAN  []  Upgrade activities as tolerated     [x]  Continue plan of care  []  Update interventions per flow sheet       []  Discharge due to:_  []  Other:_      Patria Gordon PTA, CSCS 5/25/2021  10:33 AM    Future Appointments   Date Time Provider Davis Olivas   5/28/2021  9:45 AM Yaquelin Palafox, PT [facility]

## 2021-05-26 NOTE — PROGRESS NOTES
[facility] JASON THAIMallory SHELLI [facility], [address] [place] [address]  [phone] fax    Continued Plan of Care/ Re-certification for Physical Therapy Services      Patient name: Emma Green Start of Care: 21   Referral source: Carey Higgins : 1950   Medical/Treatment Diagnosis: Bilateral hip pain [M25.551, M25.552]  Bilateral shoulder pain [M25.511, M25.512]  Neck pain [M54.2]  Payor: [place] MEDICARE / Plan: VA MEDICARE PART A & B / Product Type: Medicare /  Onset Date:21     Prior Hospitalization: see medical history Provider#: [id number]   Medications: Verified on Patient Summary List    Comorbidities: hepatitis, arthritis, hx of COVID-19 infection in 2020, HTN   Prior Level of Function: functionally independent, living alone in single story home with stairs to room above garage, enjoys yoga    Visits from Start of Care: 8    Missed Visits: 0    The Plan of Care and following information is based on the patient's current status:  Short Term Goals: To be accomplished in 1 week  - Goal: Pt to be compliant with initial HEP to improve shoulder/lumbar/hip mobility to improve ease of daily tasks. Status at last note/certification: Established and reviewed with Pt              MET; reports daily compliance  Long Term Goals: To be accomplished in 10 treatments  - Goal: Pt to demo B cervical rotation of at least 50 degrees to improve ease of visual scanning when driving.               Status at last note/certification: cervical rotation right 42 deg, left 32 deg              MET; right 63 deg, left 60 deg   - Goal: Pt to demo left seated shoulder flexion of at least 150 deg to improve ease of cleaning tasks.               Status at last note/certification: left seated shoulder flexion 123 deg              PROGRESSING; 140 deg left seated shoulder flexion     - Goal: Pt will perform 5x STS test in 12 seconds or less without UE assist to improve ease of transfers and demo improved functional LE strength. Status at last note/certification: 24 seconds without UE assist              MET; 12 seconds  - Goal: Pt to report ambulation tolerance of at least 15 minutes without increased pain to improve ease of grocery shopping. Status at last note/certification: 3-5 minute tolerance before increased hip pain              PROGRESSING; 10 minutes prior to pain            - Goal: Pt to report FOTO score of at least 50 pts to show improved function and quality of life.               Status at last note/certification: ZDWL 27 QIH               PROGRESSING; 47 deg    Key functional changes:   Functional Gains: activity tolerance, ease with stair negotiation, squatting, getting off the floor  Functional Deficits: fluctuating pain, stairs, dressing, walking/standing tolerance, reaching behind self with left shoulder, cooking  % improvement: 25%  Pain   Average: 5/10                  Best: 3/10                Worst: 8-9/10  Patient Goal: \"be able to walk through the airport and get through the gate without having to use a wheelchair\"      Problems/ barriers to goal attainment: none     Problem List: pain affecting function, decrease ROM, decrease strength, edema affecting function, impaired gait/ balance, decrease ADL/ functional abilitiies, decrease activity tolerance, decrease flexibility/ joint mobility and decrease transfer abilities    Treatment Plan: Therapeutic exercise, Therapeutic activities, Neuromuscular re-education, Physical agent/modality, Gait/balance training, Manual therapy, Aquatic therapy, Patient education, Self Care training, Functional mobility training, Home safety training and Stair training     Patient Goal (s) has been updated and includes: \"be able to walk through the airport and get through the gate without having to use a wheelchair\"     Goals for this certification period to be accomplished in 10 treatments:  - Goal: Pt to demo left seated shoulder flexion of at least 150 deg to improve ease of cleaning tasks.   Status at last note/certification: 140 deg left seated shoulder flexion     - Goal: Pt to report ambulation tolerance of at least 15 minutes without increased pain to improve ease of grocery shopping. Status at last note/certification: 10 minutes prior to pain            - Goal: Pt to report FOTO score of at least 50 pts to show improved function and quality of life. Status at last note/certification: DPQS 90 deg    Frequency / Duration: Patient to be seen 2 times per week for 10 treatments:    Assessment / Recommendations:Patient has attended therapy consistently for 8 sessions for the treatment of B shoulder/hip pain and generalized weakness. She has made good improvement at this time toward her therapy goals, demonstrating improved cervical rotation B and improved left seated shoulder flexion. She reports improved ambulation tolerance although prolonged ambulation >10 minutes continues to exacerbate hip pain. She continues to be discouraged by variable pain levels that restrict activity at home. The patient will benefit from continued skilled outpatient therapy to address remaining functional deficits. Certification Period: 5/28/21-6/26/21    Jesusaxel Cindy 5/26/2021 4:03 PM    ________________________________________________________________________  I certify that the above Therapy Services are being furnished while the patient is under my care. I agree with the treatment plan and certify that this therapy is necessary. [] I have read the above and request that my patient continue as recommended.   [] I have read the above report and request that my patient continue therapy with the following changes/special instructions: ______________________________________  [] I have read the above report and request that my patient be discharged from therapy    Physician's Signature:____________Date:_________TIME:________     Aydin Hutchins*  ** Signature, Date and Time must be completed for valid certification **    Please sign and return to [facility], [address] [place] [address]  [phone] fax

## 2021-05-28 ENCOUNTER — HOSPITAL ENCOUNTER (OUTPATIENT)
Dept: PHYSICAL THERAPY | Age: 71
Discharge: HOME | End: 2021-05-28
Payer: MEDICARE

## 2021-05-28 PROCEDURE — 97112 NEUROMUSCULAR REEDUCATION: CPT | Performed by: GENERAL ACUTE CARE HOSPITAL

## 2021-05-28 PROCEDURE — 97110 THERAPEUTIC EXERCISES: CPT | Performed by: GENERAL ACUTE CARE HOSPITAL

## 2021-05-28 PROCEDURE — 97530 THERAPEUTIC ACTIVITIES: CPT | Performed by: GENERAL ACUTE CARE HOSPITAL

## 2021-05-28 NOTE — PROGRESS NOTES
PT DAILY TREATMENT NOTE     Patient Name: Zander Avery  Date:2021  : 1950  [x]  Patient  Verified  Payor: Edson Humphrey / Plan: VA MEDICARE PART A & B / Product Type: Medicare /    In time: 276  Out time: 4968  Total Treatment Time (min): 54  Visit #: 1 of 10    Medicare/BCBS Only   Total Timed Codes (min):  44 1:1 Treatment Time:  44       Treatment Area: Bilateral hip pain [M25.551, M25.552]  Bilateral shoulder pain [M25.511, M25.512]  Neck pain [M54.2]    SUBJECTIVE  Pain Level (0-10 scale): 5/10  Any medication changes, allergies to medications, adverse drug reactions, diagnosis change, or new procedure performed?: [x] No    [] Yes (see summary sheet for update)  Subjective functional status/changes:   [] No changes reported  Pt states she was able to don pants without sitting today. Pt states \"I can tell my muscles are getting stronger, but the pain just wont go away. \"    OBJECTIVE    Modality rationale:  To reduce pain and increased tissue extensibility for improved performance with ADL's   Min Type Additional Details    [] Estim:  []Unatt       []IFC  []Premod                        []Other:  []w/ice   []w/heat  Position:  Location:    [] Estim: []Att    []TENS instruct  []NMES                    []Other:  []w/US   []w/ice   []w/heat  Position:  Location:    []  Traction: [] Cervical       []Lumbar                       [] Prone          []Supine                       []Intermittent   []Continuous Lbs:  [] before manual  [] after manual    []  Ultrasound: []Continuous   [] Pulsed                           []1MHz   []3MHz W/cm2:  Location:    []  Iontophoresis with dexamethasone         Location: [] Take home patch   [] In clinic   10 []  Ice     [x]  heat  []  Ice massage  []  Laser   []  Anodyne Position: L sidelying  Location: L shoulder     []  Laser with stim  []  Other:  Position:  Location:    []  Vasopneumatic Device Pressure:       [] lo [] med [] hi   Temperature: [] lo [] med [] hi   [x] Skin assessment post-treatment:  [x]intact []redness- no adverse reaction    []redness  adverse reaction:     15 min Therapeutic Exercise:  [x] See flow sheet :   Rationale: increase ROM and increase strength to improve the patients ability to perform ADLs    19 min Therapeutic Activity:  [x]  See flow sheet :    Rationale: increase ROM, increase strength, improve coordination, improve balance and increase proprioception  to improve the patients ability to improve mobility and reaching      10 min Neuromuscular Re-education:  [x]  See flow sheet : scap re-ed activities, hip/glut re-ed activities    Rationale: increase ROM, increase strength, improve coordination, improve balance and increase proprioception  to improve the patients ability to improve mobility, stance stability, gait, and reaching        With   [x] TE   [x] TA   [x] neuro   [] other: Patient Education: [x] Review HEP    [] Progressed/Changed HEP based on:   [x] positioning   [x] body mechanics   [] transfers   [] heat/ice application    [] other:      Other Objective/Functional Measures:     Pain Level (0-10 scale) post treatment: 3/10    ASSESSMENT/Changes in Function:   Patient continues to limited by pain in B shoulders and B hips. Good to fair tolerance with all exercises today, but unable to significantly progress secondary to elevated pain report. Patient will continue to benefit from skilled PT services to modify and progress therapeutic interventions, address functional mobility deficits, address ROM deficits, address strength deficits, analyze and address soft tissue restrictions, analyze and cue movement patterns, analyze and modify body mechanics/ergonomics, assess and modify postural abnormalities, address imbalance/dizziness and instruct in home and community integration to attain remaining goals.      []  See Plan of Care  [x]  See progress note/recertification  []  See Discharge Summary         Progress towards goals / Updated goals:  Goals for this certification period to be accomplished in 10 treatments:  - Goal: Pt to demo left seated shoulder flexion of at least 150 deg to improve ease of cleaning tasks.   Status at last note/certification: 140 deg left seated shoulder flexion  Current:     - Goal: Pt to report ambulation tolerance of at least 15 minutes without increased pain to improve ease of grocery shopping. Status at last note/certification: 10 minutes prior to pain          Current:    - Goal: Pt to report FOTO score of at least 50 pts to show improved function and quality of life.   Status at last note/certification: VJAR 01 deg  Current:    PLAN  []  Upgrade activities as tolerated     [x]  Continue plan of care  []  Update interventions per flow sheet       []  Discharge due to:_  []  Other:_      Juan Fierro, PT 5/28/2021  10:33 AM    Future Appointments   Date Time Provider Davis Olivas   5/28/2021  9:45 AM Jeevan Morales [facility]   6/2/2021  9:45 AM [facility]   6/4/2021 10:30 AM [facility]   6/7/2021  9:00 AM Concepcion Osullivan, [facility]   6/11/2021  9:45 AM [facility]   6/15/2021  9:45 AM [facility]   6/17/2021  9:00 AM Concepcion Osullivan, [facility]   6/22/2021  9:00 AM [facility]   6/24/2021  9:00 AM Concepcion Osullivan, [facility]

## 2021-06-02 ENCOUNTER — HOSPITAL ENCOUNTER (OUTPATIENT)
Dept: PHYSICAL THERAPY | Age: 71
Discharge: HOME | End: 2021-06-02
Payer: MEDICARE

## 2021-06-02 PROCEDURE — 97110 THERAPEUTIC EXERCISES: CPT

## 2021-06-02 PROCEDURE — 97112 NEUROMUSCULAR REEDUCATION: CPT

## 2021-06-02 NOTE — PROGRESS NOTES
PT DAILY TREATMENT NOTE     Patient Name: Emma Green  Date:2021  : 1950  [x]  Patient  Verified  Payor: VA MEDICARE / Plan: VA MEDICARE PART A & B / Product Type: Medicare /    In time:9:50  Out time: 10:45  Total Treatment Time (min): 55  Visit #: 2 of 10    Medicare/BCBS Only   Total Timed Codes (min):  45 1:1 Treatment Time:  45       Treatment Area: Bilateral hip pain [M25.551, M25.552]  Bilateral shoulder pain [M25.511, M25.512]  Neck pain [M54.2]    SUBJECTIVE  Pain Level (0-10 scale): 6  Any medication changes, allergies to medications, adverse drug reactions, diagnosis change, or new procedure performed?: [x] No    [] Yes (see summary sheet for update)  Subjective functional status/changes:   [] No changes reported  I don't know if pulled something while working out or I slept wrong, but I'm having more pain in my left shoulder and shoulder blade    OBJECTIVE    Modality rationale: decrease pain and increase tissue extensibility to improve the patients ability to perform functional tasks   Min Type Additional Details    [] Estim:  []Unatt       []IFC  []Premod                        []Other:  []w/ice   []w/heat  Position:  Location:    [] Estim: []Att    []TENS instruct  []NMES                    []Other:  []w/US   []w/ice   []w/heat  Position:  Location:    []  Traction: [] Cervical       []Lumbar                       [] Prone          []Supine                       []Intermittent   []Continuous Lbs:  [] before manual  [] after manual    []  Ultrasound: []Continuous   [] Pulsed                           []1MHz   []3MHz W/cm2:  Location:    []  Iontophoresis with dexamethasone         Location: [] Take home patch   [] In clinic   10 []  Ice     [x]  heat  []  Ice massage  []  Laser   []  Anodyne Position: sitting  Location: left shoulder    []  Laser with stim  []  Other:  Position:  Location:    []  Vasopneumatic Device  Pre-treatment girth:  Post-treatment girth:  Measured at (location):  Pressure:       [] lo [] med [] hi   Temperature: [] lo [] med [] hi   [x] Skin assessment post-treatment:  [x]intact []redness- no adverse reaction    []redness  adverse reaction:       35 min Therapeutic Exercise:  [] See flow sheet :   Rationale: increase ROM and increase strength to improve the patients ability to perform household tasks, shopping     10 min Neuromuscular Re-education:  []  See flow sheet :   Rationale: increase strength and improve coordination  to improve the patients ability to increase stability, ambulation tolerance            With   [] TE   [] TA   [] neuro   [] other: Patient Education: [x] Review HEP    [] Progressed/Changed HEP based on:   [] positioning   [] body mechanics   [] transfers   [] heat/ice application    [] other:      Other Objective/Functional Measures:  Ex's per card     Pain Level (0-10 scale) post treatment: 5    ASSESSMENT/Changes in Function: increase c/o pain in left shoulder that pt is unsure of aggravating factor. Fatigue in LE's after squats, but able to perform without UE support    Patient will continue to benefit from skilled PT services to modify and progress therapeutic interventions, address functional mobility deficits, address ROM deficits, address strength deficits, analyze and address soft tissue restrictions, analyze and cue movement patterns, analyze and modify body mechanics/ergonomics, assess and modify postural abnormalities, address imbalance/dizziness and instruct in home and community integration to attain remaining goals.      []  See Plan of Care  []  See progress note/recertification  []  See Discharge Summary         Progress towards goals / Updated goals:  - Goal: Pt to demo left seated shoulder flexion of at least 150 deg to improve ease of cleaning tasks.   Status at last note/certification: 140 deg left seated shoulder flexion  Current:     - Goal: Pt to report ambulation tolerance of at least 15 minutes without increased pain to improve ease of grocery shopping. Status at last note/certification: 10 minutes prior to pain          Current:    - Goal: Pt to report FOTO score of at least 50 pts to show improved function and quality of life.   Status at last note/certification: VLQJ 72 deg  Current:       PLAN  []  Upgrade activities as tolerated     []  Continue plan of care  []  Update interventions per flow sheet       []  Discharge due to:_  []  Other:_      Felicia Serrato, PTA 6/2/2021  9:59 AM    Future Appointments   Date Time Provider Davis Olivas   6/4/2021 10:30 AM [facility]   6/7/2021  9:00 AM Maci Osullivan, [facility]   6/11/2021  9:45 AM [facility]   6/15/2021  4:30 PM [facility]   6/17/2021  9:00 AM Maci Osullivan Render, PT [facility]   6/22/2021  9:00 AM [facility]   6/24/2021  9:00 AM Maci Osullivan Render, [facility]

## 2021-06-04 ENCOUNTER — HOSPITAL ENCOUNTER (OUTPATIENT)
Dept: PHYSICAL THERAPY | Age: 71
Discharge: HOME | End: 2021-06-04
Payer: MEDICARE

## 2021-06-04 PROCEDURE — 97110 THERAPEUTIC EXERCISES: CPT

## 2021-06-04 PROCEDURE — 97112 NEUROMUSCULAR REEDUCATION: CPT

## 2021-06-04 NOTE — PROGRESS NOTES
PT DAILY TREATMENT NOTE     Patient Name: Jannet Larry  Date:2021  : 1950  [x]  Patient  Verified  Payor: VA MEDICARE / Plan: VA MEDICARE PART A & B / Product Type: Medicare /    In time:10:30  Out time:11:30  Total Treatment Time (min): 60  Visit #: 3 of 10    Medicare/BCBS Only   Total Timed Codes (min):  50 1:1 Treatment Time:  50       Treatment Area: Bilateral hip pain [M25.551, M25.552]  Bilateral shoulder pain [M25.511, M25.512]  Neck pain [M54.2]    SUBJECTIVE  Pain Level (0-10 scale): 5  Any medication changes, allergies to medications, adverse drug reactions, diagnosis change, or new procedure performed?: [x] No    [] Yes (see summary sheet for update)  Subjective functional status/changes:   [] No changes reported  I feel like I can squat better at home    OBJECTIVE    Modality rationale: decrease pain and increase tissue extensibility to improve the patients ability to perofrm functional tasks   Min Type Additional Details    [] Estim:  []Unatt       []IFC  []Premod                        []Other:  []w/ice   []w/heat  Position:  Location:    [] Estim: []Att    []TENS instruct  []NMES                    []Other:  []w/US   []w/ice   []w/heat  Position:  Location:    []  Traction: [] Cervical       []Lumbar                       [] Prone          []Supine                       []Intermittent   []Continuous Lbs:  [] before manual  [] after manual    []  Ultrasound: []Continuous   [] Pulsed                           []1MHz   []3MHz W/cm2:  Location:    []  Iontophoresis with dexamethasone         Location: [] Take home patch   [] In clinic   10 []  Ice     [x]  heat  []  Ice massage  []  Laser   []  Anodyne Position: supine  Location: bilateral hips    []  Laser with stim  []  Other:  Position:  Location:    []  Vasopneumatic Device  Pre-treatment girth:  Post-treatment girth:  Measured at (location):  Pressure:       [] lo [] med [] hi   Temperature: [] lo [] med [] hi   [x] Skin assessment post-treatment:  [x]intact []redness- no adverse reaction    []redness  adverse reaction:       35 min Therapeutic Exercise:  [] See flow sheet :   Rationale: increase ROM and increase strength to improve the patients ability to perform shopping, household tasks       15 min Neuromuscular Re-education:  []  See flow sheet :   Rationale: increase strength and improve coordination  to improve the patients stability, activity tolerance            With   [] TE   [] TA   [] neuro   [] other: Patient Education: [x] Review HEP    [] Progressed/Changed HEP based on:   [] positioning   [] body mechanics   [] transfers   [] heat/ice application    [] other:      Other Objective/Functional Measures: ex's per card     Pain Level (0-10 scale) post treatment: 4    ASSESSMENT/Changes in Function: increase stability with squats,  Demonstrates correct weight shift and ability to transition to [facility] reach with no LOB    Patient will continue to benefit from skilled PT services to modify and progress therapeutic interventions, address functional mobility deficits, address ROM deficits, address strength deficits, analyze and address soft tissue restrictions, analyze and cue movement patterns, analyze and modify body mechanics/ergonomics, assess and modify postural abnormalities, address imbalance/dizziness and instruct in home and community integration to attain remaining goals. []  See Plan of Care  []  See progress note/recertification  []  See Discharge Summary         Progress towards goals / Updated goals:  - Goal: Pt to demo left seated shoulder flexion of at least 150 deg to improve ease of cleaning tasks.   Status at last note/certification: 140 deg left seated shoulder flexion  Current:     - Goal: Pt to report ambulation tolerance of at least 15 minutes without increased pain to improve ease of grocery shopping.   Status at last note/certification: 10 minutes prior to pain          Current:    - Goal: Pt to report FOTO score of at least 50 pts to show improved function and quality of life.   Status at last note/certification: ASED 10 deg  Current:       PLAN  [x]  Upgrade activities as tolerated     []  Continue plan of care  []  Update interventions per flow sheet       []  Discharge due to:_  []  Other:_      Sarahrohan Jennifer, JAMES 6/4/2021  10:37 AM    Future Appointments   Date Time Provider Davis Olivas   6/7/2021  9:00 AM Tomas Osullivan, PT [facility]   6/11/2021  9:45 AM Kaylene Abbott [facility]   6/15/2021  4:30 PM Kaylene Abbott [facility]   6/17/2021  9:00 AM Tomas Osullivan, [facility]   6/22/2021  9:00 AM Kaylene Abbott [facility]   6/24/2021  9:00 AM Tomas Osullivan, PT [facility]

## 2021-06-07 ENCOUNTER — HOSPITAL ENCOUNTER (OUTPATIENT)
Dept: PHYSICAL THERAPY | Age: 71
Discharge: HOME | End: 2021-06-07
Payer: MEDICARE

## 2021-06-07 PROCEDURE — 97112 NEUROMUSCULAR REEDUCATION: CPT

## 2021-06-07 PROCEDURE — 97110 THERAPEUTIC EXERCISES: CPT

## 2021-06-07 NOTE — PROGRESS NOTES
PT DAILY TREATMENT NOTE     Patient Name: Pablo Larson  Date:2021  : 1950  [x]  Patient  Verified  Payor: [place] MEDICARE / Plan: [place] MEDICARE PART A & B / Product Type: Medicare /    In time:9:00  Out time:9:50  Total Treatment Time (min): 50  Visit #: 4 of 10    Medicare/BCBS Only   Total Timed Codes (min):  50 1:1 Treatment Time:  50       Treatment Area: Bilateral hip pain [M25.551, M25.552]  Bilateral shoulder pain [M25.511, M25.512]  Neck pain [M54.2]    SUBJECTIVE  Pain Level (0-10 scale): 3/10  Any medication changes, allergies to medications, adverse drug reactions, diagnosis change, or new procedure performed?: [x] No    [] Yes (see summary sheet for update)  Subjective functional status/changes:   [] No changes reported  \"I feel so much better than last time. The rainy weather last week had me in so much pain that I had a hard time breathing. At least now I can move better. \"    OBJECTIVE    35 min Therapeutic Exercise:  [] See flow sheet :   Rationale: increase ROM and increase strength to improve the patients ability to perform grocery shopping, household tasks       15 min Neuromuscular Re-education:  []  See flow sheet :   Rationale: increase strength and improve coordination  to improve the patients stability, activity tolerance            With   [] TE   [] TA   [] neuro   [] other: Patient Education: [x] Review HEP    [] Progressed/Changed HEP based on:   [] positioning   [] body mechanics   [] transfers   [] heat/ice application    [] other:      Other Objective/Functional Measures: Performed exercises per flow sheet. Pushups done at wall. Pain Level (0-10 scale) post treatment: 3-4/10    ASSESSMENT/Changes in Function: Pt exhibits improved squatting ability and endurance without increased discomfort. B shoulder ROM and strength improving without flare in pain. Pt noted only muscle soreness and fatigue after session.   Pt declined modalities but advised to use either heat or ice at home, depending on symptoms x 10-15 minutes. Pt left session in no apparent distress. Patient will continue to benefit from skilled PT services to modify and progress therapeutic interventions, address functional mobility deficits, address ROM deficits, address strength deficits, analyze and address soft tissue restrictions, analyze and cue movement patterns, analyze and modify body mechanics/ergonomics, assess and modify postural abnormalities, address imbalance/dizziness and instruct in home and community integration to attain remaining goals. []  See Plan of Care  []  See progress note/recertification  []  See Discharge Summary         Progress towards goals / Updated goals:  - Goal: Pt to demo left seated shoulder flexion of at least 150 deg to improve ease of cleaning tasks.   Status at last note/certification: 140 deg left seated shoulder flexion  Current:     - Goal: Pt to report ambulation tolerance of at least 15 minutes without increased pain to improve ease of grocery shopping. Status at last note/certification: 10 minutes prior to pain          Current:    - Goal: Pt to report FOTO score of at least 50 pts to show improved function and quality of life.   Status at last note/certification: XWAX 66 pts  Current: reassess at MD note (6/7/21)       PLAN  [x]  Upgrade activities as tolerated     []  Continue plan of care  []  Update interventions per flow sheet       []  Discharge due to:_  []  Other:_      Marcia Osullivan, PT 6/7/2021  10:37 AM    Future Appointments   Date Time Provider Davis Olivas   6/11/2021  9:45 AM Chava Knowles [facility]   6/15/2021  4:30 PM Chavez Jasmine2 [address]   6/17/2021  9:00 AM Marcia Osullivan, PT [facility]   6/22/2021  9:00 AM Chava Knowles [facility]   6/24/2021  9:00 AM Marcia Osullivan, PT [facility]

## 2021-06-11 ENCOUNTER — HOSPITAL ENCOUNTER (OUTPATIENT)
Dept: PHYSICAL THERAPY | Age: 71
Discharge: HOME | End: 2021-06-11
Payer: MEDICARE

## 2021-06-11 PROCEDURE — 97110 THERAPEUTIC EXERCISES: CPT

## 2021-06-11 PROCEDURE — 97112 NEUROMUSCULAR REEDUCATION: CPT

## 2021-06-11 NOTE — PROGRESS NOTES
PT DAILY TREATMENT NOTE     Patient Name: Stone Heaton  Date:2021  : 1950  [x]  Patient  Verified  Payor: Lilly Hind / Plan: [place] MEDICARE PART A & B / Product Type: Medicare /    In time:9:45  Out time: 10:30  Total Treatment Time (min): 45  Visit #: 5 of 10    Medicare/BCBS Only   Total Timed Codes (min):  45 1:1 Treatment Time:  45       Treatment Area: Bilateral hip pain [M25.551, M25.552]  Bilateral shoulder pain [M25.511, M25.512]  Neck pain [M54.2]    SUBJECTIVE  Pain Level (0-10 scale): 4  Any medication changes, allergies to medications, adverse drug reactions, diagnosis change, or new procedure performed?: [x] No    [] Yes (see summary sheet for update)  Subjective functional status/changes:   [] No changes reported  Sore today from the weather. I saw my MD and she said that for now try to manage hip pain with meds, and when I can't  tolerate symptoms anymore, then consider THR.     OBJECTIVE    30 min Therapeutic Exercise:  [] See flow sheet :   Rationale: increase ROM and increase strength to improve the patients ability to improve ease of reach, household tasks     15 min Neuromuscular Re-education:  []  See flow sheet :   Rationale: increase strength and improve coordination  to improve the patients ability to improve ambulation tolerance            With   [] TE   [] TA   [] neuro   [] other: Patient Education: [x] Review HEP    [] Progressed/Changed HEP based on:   [] positioning   [] body mechanics   [] transfers   [] heat/ice application    [] other:      Other Objective/Functional Measures:  AROM left shoulder flexion 135 seated     Pain Level (0-10 scale) post treatment: 4    ASSESSMENT/Changes in Function: Pt describes improved strength in LE's however no gains in walking tolerance    Patient will continue to benefit from skilled PT services to modify and progress therapeutic interventions, address functional mobility deficits, address ROM deficits, address strength deficits, analyze and address soft tissue restrictions, analyze and cue movement patterns, analyze and modify body mechanics/ergonomics, assess and modify postural abnormalities, address imbalance/dizziness and instruct in home and community integration to attain remaining goals. []  See Plan of Care  []  See progress note/recertification  []  See Discharge Summary         Progress towards goals / Updated goals:   - Goal: Pt to demo left seated shoulder flexion of at least 150 deg to improve ease of cleaning tasks.   Status at last note/certification: 140 deg left seated shoulder flexion  Current:    135 AROM seated sore today (6/11/2021)  - Goal: Pt to report ambulation tolerance of at least 15 minutes without increased pain to improve ease of grocery shopping. Status at last note/certification: 10 minutes prior to pain          Current:  not met 10-15 minutes  (6/11/2021)  - Goal: Pt to report FOTO score of at least 50 pts to show improved function and quality of life.   Status at last note/certification: OWRT 75 pts  Current: reassess at MD note (6/7/21)    PLAN  [x]  Upgrade activities as tolerated     []  Continue plan of care  []  Update interventions per flow sheet       []  Discharge due to:_  []  Other:_      Paolo Easley, JAMES 6/11/2021  9:59 AM    Future Appointments   Date Time Provider Davis Olivas   6/15/2021  4:30 PM Batsheva Chiang [facility]   6/17/2021  9:00 AM Gus Osullivan, PT [facility]   6/22/2021  9:00 AM Chavez Jasmine[address] [place] [address]   6/24/2021  9:00 AM Gus Osullivan, [facility]

## 2021-06-15 ENCOUNTER — HOSPITAL ENCOUNTER (OUTPATIENT)
Dept: PHYSICAL THERAPY | Age: 71
Discharge: HOME | End: 2021-06-15
Payer: MEDICARE

## 2021-06-15 PROCEDURE — 97112 NEUROMUSCULAR REEDUCATION: CPT

## 2021-06-15 PROCEDURE — 97110 THERAPEUTIC EXERCISES: CPT

## 2021-06-15 NOTE — PROGRESS NOTES
PT DAILY TREATMENT NOTE     Patient Name: Ann Sparks  Date:6/15/2021  : 1950  [x]  Patient  Verified  Payor: VA MEDICARE / Plan: [place] MEDICARE PART A & B / Product Type: Medicare /    In time:4:30  Out time:5:20  Total Treatment Time (min): 50  Visit #: 6 of 10    Medicare/BCBS Only   Total Timed Codes (min):  50 1:1 Treatment Time:  50       Treatment Area: Bilateral hip pain [M25.551, M25.552]  Bilateral shoulder pain [M25.511, M25.512]  Neck pain [M54.2]    SUBJECTIVE  Pain Level (0-10 scale): 6  Any medication changes, allergies to medications, adverse drug reactions, diagnosis change, or new procedure performed?: [x] No    [] Yes (see summary sheet for update)  Subjective functional status/changes:   [] No changes reported  My right hip and left shoulder are hurting today. I understand why the hip hurts because I had an appointment at the [facility] and walking fr the garage to the hospital is long. But not sure why my shoulder hurts. Other than I slept through the night for the first time. Maybe I was sleeping on that side too long. OBJECTIVE        15 min Therapeutic Exercise:  [] See flow sheet :   Rationale: increase ROM and increase strength to improve the patients ability to perform household tasks    35 min Neuromuscular Re-education:  []  See flow sheet :   Rationale: increase strength, improve coordination and increase proprioception  to improve the patients ability to increase stability for gait, functional tasks            With   [] TE   [] TA   [] neuro   [] other: Patient Education: [x] Review HEP    [] Progressed/Changed HEP based on:   [] positioning   [] body mechanics   [] transfers   [] heat/ice application    [] other:      Other Objective/Functional Measures:  Ex's per card    Pain Level (0-10 scale) post treatment: 4 hips    ASSESSMENT/Changes in Function: Increased c/o hip and shoulder pain today.   Hip pain likely due to increased walking at [employer] [facility].    Patient will continue to benefit from skilled PT services to modify and progress therapeutic interventions, address functional mobility deficits, address ROM deficits, address strength deficits, analyze and address soft tissue restrictions, analyze and cue movement patterns, analyze and modify body mechanics/ergonomics, assess and modify postural abnormalities, address imbalance/dizziness and instruct in home and community integration to attain remaining goals. []  See Plan of Care  []  See progress note/recertification  []  See Discharge Summary         Progress towards goals / Updated goals:  - Goal: Pt to demo left seated shoulder flexion of at least 150 deg to improve ease of cleaning tasks.   Status at last note/certification: 140 deg left seated shoulder flexion  Current:    135 AROM seated sore today (6/11/2021)  - Goal: Pt to report ambulation tolerance of at least 15 minutes without increased pain to improve ease of grocery shopping. Status at last note/certification: 10 minutes prior to pain          Current:  not met 10-15 minutes  (6/11/2021)  - Goal: Pt to report FOTO score of at least 50 pts to show improved function and quality of life.   Status at last note/certification: NFFY 96 IPY  Current: reassess at MD note (6/7/21)    PLAN  [x]  Upgrade activities as tolerated     []  Continue plan of care  []  Update interventions per flow sheet       []  Discharge due to:_  []  Other:_      Pia Pollack, JAMES 6/15/2021  4:44 PM    Future Appointments   Date Time Provider Davis Olivas   6/17/2021  9:00 AM Allyson Osullivan, PT [facility]   6/22/2021  9:00 AM Pedro David [facility]   6/24/2021  9:00 AM Allyson Osullivan PT [facility]

## 2021-06-17 ENCOUNTER — HOSPITAL ENCOUNTER (OUTPATIENT)
Dept: PHYSICAL THERAPY | Age: 71
Discharge: HOME | End: 2021-06-17
Payer: MEDICARE

## 2021-06-17 PROCEDURE — 97140 MANUAL THERAPY 1/> REGIONS: CPT

## 2021-06-17 PROCEDURE — 97110 THERAPEUTIC EXERCISES: CPT

## 2021-06-17 PROCEDURE — 97112 NEUROMUSCULAR REEDUCATION: CPT

## 2021-06-17 NOTE — PROGRESS NOTES
PT DAILY TREATMENT NOTE     Patient Name: Kalia Brownlee  Date:2021  : 1950  [x]  Patient  Verified  Payor: VA MEDICARE / Plan: [place] MEDICARE PART A & B / Product Type: Medicare /    In time:9:01  Out time:9:49  Total Treatment Time (min): 48  Visit #: 7 of 10    Medicare/BCBS Only   Total Timed Codes (min):  48 1:1 Treatment Time:  48       Treatment Area: Bilateral hip pain [M25.551, M25.552]  Bilateral shoulder pain [M25.511, M25.512]  Neck pain [M54.2]    SUBJECTIVE  Pain Level (0-10 scale): 5/10  Any medication changes, allergies to medications, adverse drug reactions, diagnosis change, or new procedure performed?: [x] No    [] Yes (see summary sheet for update)  Subjective functional status/changes:   [] No changes reported  \"I have just been having this lingering, achy pain in the left shoulder that just won't go away. I don't know what to do anymore to help it. It's not as bad today as it has been but it still bothers me. \"    OBJECTIVE    15 min Therapeutic Exercise:  [] See flow sheet :   Rationale: increase ROM and increase strength to improve the patients ability to perform household tasks    25 min Neuromuscular Re-education:  []  See flow sheet :   Rationale: increase strength, improve coordination and increase proprioception  to improve the patients ability to increase stability for gait, functional tasks    8 min Manual Therapy:  S/L STM to left infraspinatus ms   The manual therapy interventions were performed at a separate and distinct time from the therapeutic activities interventions.   Rationale: increase ROM and increase tissue extensibility to improve left shoulder mobility for ease of overhead reaching, performance of ADLs          With   [] TE   [] TA   [] neuro   [] other: Patient Education: [x] Review HEP    [] Progressed/Changed HEP based on:   [] positioning   [] body mechanics   [] transfers   [] heat/ice application    [] other:      Other Objective/Functional Measures: Palpable taut bands in left infraspinatus, reproducing Pt's pain complaints in left shoulder - added manual to address. Pain Level (0-10 scale) post treatment: 3/10    ASSESSMENT/Changes in Function:  Pt exhibited taut bands in left shoulder infraspinatus ms that reduced with manual interventions and reduced left shoulder pain complaints. Pt still notes difficulty with walking longer than 10 minutes with pain and decreased endurance. Pt and PT discussed her progress thus far and will finish out remaining visits and discharge to updated [facility]. Patient will continue to benefit from skilled PT services to modify and progress therapeutic interventions, address functional mobility deficits, address ROM deficits, address strength deficits, analyze and address soft tissue restrictions, analyze and cue movement patterns, analyze and modify body mechanics/ergonomics, assess and modify postural abnormalities, address imbalance/dizziness and instruct in home and community integration to attain remaining goals. []  See Plan of Care  []  See progress note/recertification  []  See Discharge Summary         Progress towards goals / Updated goals:  - Goal: Pt to demo left seated shoulder flexion of at least 150 deg to improve ease of cleaning tasks.   Status at last note/certification: 140 deg left seated shoulder flexion  Current: progressing - back to 140 deg without increased pain (6/17/21)  - Goal: Pt to report ambulation tolerance of at least 15 minutes without increased pain to improve ease of grocery shopping. Status at last note/certification: 10 minutes prior to pain          Current: not met - 10-15 minutes but with difficulty (6/17/21)  - Goal: Pt to report FOTO score of at least 50 pts to show improved function and quality of life.   Status at last note/certification: [address] SXS  Current: reassess at MD note (6/17/21)    PLAN  [x]  Upgrade activities as tolerated     [] Continue plan of care  []  Update interventions per flow sheet       []  Discharge due to:_  []  Other:_      Charise Nageotte Daughtry, PT 6/17/2021  4:44 PM    Future Appointments   Date Time Provider Davis Olivas   6/22/2021  [facility]   6/24/2021  9:00 AM Williams Higginbotham

## 2021-06-22 ENCOUNTER — HOSPITAL ENCOUNTER (OUTPATIENT)
Dept: PHYSICAL THERAPY | Age: 71
Discharge: HOME | End: 2021-06-22
Payer: MEDICARE

## 2021-06-22 PROCEDURE — 97140 MANUAL THERAPY 1/> REGIONS: CPT

## 2021-06-22 PROCEDURE — 97110 THERAPEUTIC EXERCISES: CPT

## 2021-06-22 PROCEDURE — 97112 NEUROMUSCULAR REEDUCATION: CPT

## 2021-06-22 NOTE — PROGRESS NOTES
PT DAILY TREATMENT NOTE     Patient Name: Emma Green  Date:2021  : 1950  [x]  Patient  Verified  Payor: Ciara Parker / Plan: [place] MEDICARE PART A & B / Product Type: Medicare /    In time:9:00  Out time:9:45  Total Treatment Time (min): 45  Visit #: 8 of 10    Medicare/BCBS Only   Total Timed Codes (min):  45 1:1 Treatment Time:  45       Treatment Area: Bilateral hip pain [M25.551, M25.552]  Bilateral shoulder pain [M25.511, M25.512]  Neck pain [M54.2]    SUBJECTIVE  Pain Level (0-10 scale): 5 right hip  Any medication changes, allergies to medications, adverse drug reactions, diagnosis change, or new procedure performed?: [x] No    [] Yes (see summary sheet for update)  Subjective functional status/changes:   [] No changes reported  My shoulder feels so much better since Kena worked on it. But today it's my right hip    OBJECTIVE    10 min Therapeutic Exercise:  [] See flow sheet :   Rationale: increase ROM and increase strength to improve the patients ability to perofrm functional tasks, shopping     25 min Neuromuscular Re-education:  []  See flow sheet :   Rationale: increase strength and improve coordination  to improve the patients ability to increase standing/walkking tolerance    10 min Manual Therapy:  STM to right glute medius, QL, TFL   The manual therapy interventions were performed at a separate and distinct time from the therapeutic activities interventions.   Rationale: decrease pain, increase ROM and increase tissue extensibility to improve ease of gait, pain for functional tasks          With   [] TE   [] TA   [] neuro   [] other: Patient Education: [x] Review HEP    [] Progressed/Changed HEP based on:   [] positioning   [] body mechanics   [] transfers   [] heat/ice application    [] other:      Other Objective/Functional Measures: added MT to address right hip pain     Pain Level (0-10 scale) post treatment: 3    ASSESSMENT/Changes in Function: palpable tenderness in right QL, TFL, glute medius  Held lateral walks to avoid aggravation of symptoms today    Patient will continue to benefit from skilled PT services to modify and progress therapeutic interventions, address functional mobility deficits, address ROM deficits, address strength deficits, analyze and address soft tissue restrictions, analyze and cue movement patterns, analyze and modify body mechanics/ergonomics, assess and modify postural abnormalities, address imbalance/dizziness and instruct in home and community integration to attain remaining goals. []  See Plan of Care  []  See progress note/recertification  []  See Discharge Summary         Progress towards goals / Updated goals:  - Goal: Pt to demo left seated shoulder flexion of at least 150 deg to improve ease of cleaning tasks.   Status at last note/certification: 140 deg left seated shoulder flexion  Current: progressing - back to 140 deg without increased pain (6/17/21)  - Goal: Pt to report ambulation tolerance of at least 15 minutes without increased pain to improve ease of grocery shopping. Status at last note/certification: 10 minutes prior to pain          Current: not met - 10-15 minutes but with difficulty (6/17/21)  - Goal: Pt to report FOTO score of at least 50 pts to show improved function and quality of life.   Status at last note/certification: NMEG 20 GTP  Current: reassess at MD note (6/17/21)    PLAN  [x]  Upgrade activities as tolerated     []  Continue plan of care  []  Update interventions per flow sheet       []  Discharge due to:_  []  Other:_      Paolo Easley, JAMES 6/22/2021  9:04 AM    Future Appointments   Date Time Provider Davis Olivas   6/24/2021  9:00 AM Holly Muleln

## 2021-06-24 ENCOUNTER — HOSPITAL ENCOUNTER (OUTPATIENT)
Dept: PHYSICAL THERAPY | Age: 71
Discharge: HOME | End: 2021-06-24
Payer: MEDICARE

## 2021-06-24 PROCEDURE — 97530 THERAPEUTIC ACTIVITIES: CPT

## 2021-06-24 NOTE — PROGRESS NOTES
Physical Therapy Discharge Instructions    [facility] - [facility], [address] [place] [address]  [phone] fax    Patient: Ovidio Corrigan  : 1950    Continue Home Exercise Program 4-5 times per week. Continue with    [x] Ice  as needed 2-3 times per day     [x] Heat (preference)           Follow up with MD:     [] Upon completion of therapy     [x] As needed    Recommendations:     [x]   Return to activity with home program    [x]   Return to activity with the following modifications:       []Post Rehab Program    []Join Independent aquatic program     [x]Return to/join local gym    Additional Comments: Keep up the great work! It has been a pleasure working with you. Please feel free to reach out in the future if you have any questions, we are always more than happy to help. Take care!       Daniella Yates PT, DPT 2021 9:26 AM\

## 2021-06-24 NOTE — PROGRESS NOTES
PT DISCHARGE DAILY NOTE AND [id number]    Date:2021  Patient name: Colletta Points Start of Care: 21   Referral source: Deann Parikh* : 1950   Medical/Treatment Diagnosis: Bilateral hip pain [M25.551, M25.552]  Bilateral shoulder pain [M25.511, M25.512]  Neck pain [M54.2] Onset Date:21     Prior Hospitalization: see medical history Provider#: [id number]   Medications: Verified on Patient Summary List    Comorbidities: hepatitis, arthritis, hx of COVID-19 infection in 2020, HTN   Prior Level of Function: functionally independent, living alone in single story home with stairs to room above garage, enjoys yoga    Visits from [place] of Care: 18    Missed Visits: 0    Reporting Period : 21 to 21    [x]  Patient  Verified  Payor: [place] MED[place] / Plan: [place] MEDICARE PART A & B / Product Type: Medicare /    In time:9:00  Out time:9:44  Total Treatment Time (min): 44  Visit #: 9 of 10    Medicare/BCBS Only   Total Timed Codes (min):  44 1:1 Treatment Time:  40       SUBJECTIVE  Pain Level (0-10 scale): 4 in hip, 2 in shoulder  Any medication changes, allergies to medications, adverse drug reactions, diagnosis change, or new procedure performed?: [x] No    [] Yes (see summary sheet for update)  Subjective functional status/changes:   [] No changes reported  Today is the last day. I am doing much better than when I started. OBJECTIVE    4 min Therapeutic Exercise:  [] See flow sheet :   Rationale: increase strength, improve coordination, improve balance and increase proprioception to improve the patients ability to perform reaching/pulling tasks. 40 min Therapeutic Activity:  []  See flow sheet :   Rationale: increase strength, improve coordination, improve balance and increase proprioception  to improve the patients ability to functional transfers/lifting.    Pt education: progress toward goals, HEP, discharge planning, incorporating exercise into daily routine, improving ambulation tolerance in [facility], slowly increasing activity tolerance          With   [] TE   [x] TA   [] neuro   [] other: Patient Education: [x] Review HEP    [x] Progressed/Changed HEP based on: improving ambulation tolernace  [] positioning   [] body mechanics   [] transfers   [] heat/ice application    [] other:      Other Objective/Functional Measures:   Functional Gains: better able to manage discomfort with exercise, better able to walk longer distances  Functional Deficits: lingering discomfort in right hip and left shoulder  % improvement: 55-60%  Pain   Average: 3-4/10       Best: 2/10     Worst: 6/10    Pain Level (0-10 scale) post treatment: 4 in hip, 2 in shoulder    Summary of Care:  - Goal: Pt to demo left seated shoulder flexion of at least 150 deg to improve ease of cleaning tasks.   Status at last note/certification: 140 deg left seated shoulder flexion  Current: progressing - back to 140 deg without increased pain (6/24/21)  - Goal: Pt to report ambulation tolerance of at least 15 minutes without increased pain to improve ease of grocery shopping. Status at last note/certification: 10 minutes prior to pain          Current: not met - remains 10-15 minutes but with difficulty (6/24/21)  - Goal: Pt to report FOTO score of at least 50 pts to show improved function and quality of life. Status at last note/certification: MJAL 45 WXN  Current: met, FOTO 61 pts (6/24/21)    ASSESSMENT/Changes in Function: Pt attended therapy consistently for 18 visits for the treatment of generalized pain/weakness and deconditioning. At this point, the patient reports 55-60% improvement since [facility] with specific improvements in the following areas: decreased knee pain, improved overhead reaching ability, improving squatting/lifting ability, slightly increased ambulation tolerance, and improved overall strength/mobility.  The patient has also demonstrated improvement in her FOTO score from 39 pts to 61 pts, demonstrating improved function in the home and community. The patient is appropriate for discharge at this time with a comprehensive HEP and will follow up with our office about any questions that arise following discharge. She plans to return to yoga at this time for continued strengthening/return to PLOF fitness.     Thank you for this referral!     PLAN  [x]Discontinue therapy: [x]Patient has reached or is progressing toward set goals      []Patient is non-compliant or has abdicated      []Due to lack of appreciable progress towards set goals    Fer Colon 6/24/2021  7:26 AM

## 2023-06-10 ENCOUNTER — HOSPITAL ENCOUNTER (OUTPATIENT)
Facility: HOSPITAL | Age: 73
End: 2023-06-10
Payer: MEDICARE

## 2023-06-10 DIAGNOSIS — R51.9 FACIAL PAIN: ICD-10-CM

## 2023-06-10 DIAGNOSIS — R42 DIZZINESS AND GIDDINESS: ICD-10-CM

## 2023-06-10 DIAGNOSIS — R11.0 NAUSEA: ICD-10-CM

## 2023-06-10 LAB — CREAT UR-MCNC: 0.8 MG/DL (ref 0.6–1.3)

## 2023-06-10 PROCEDURE — 6360000004 HC RX CONTRAST MEDICATION: Performed by: NURSE PRACTITIONER

## 2023-06-10 PROCEDURE — A9577 INJ MULTIHANCE: HCPCS | Performed by: NURSE PRACTITIONER

## 2023-06-10 PROCEDURE — 70553 MRI BRAIN STEM W/O & W/DYE: CPT

## 2023-06-10 PROCEDURE — 82565 ASSAY OF CREATININE: CPT

## 2023-06-10 RX ADMIN — GADOBENATE DIMEGLUMINE 17 ML: 529 INJECTION, SOLUTION INTRAVENOUS at 07:36

## 2023-07-28 ENCOUNTER — HOSPITAL ENCOUNTER (EMERGENCY)
Facility: HOSPITAL | Age: 73
Discharge: HOME | End: 2023-07-28
Attending: EMERGENCY MEDICINE
Payer: MEDICARE

## 2023-07-28 VITALS
TEMPERATURE: 97.6 F | SYSTOLIC BLOOD PRESSURE: 161 MMHG | OXYGEN SATURATION: 98 % | HEIGHT: 64 IN | DIASTOLIC BLOOD PRESSURE: 72 MMHG | HEART RATE: 98 BPM | WEIGHT: 175 LBS | RESPIRATION RATE: 18 BRPM | BODY MASS INDEX: 29.88 KG/M2

## 2023-07-28 DIAGNOSIS — R30.0 DYSURIA: ICD-10-CM

## 2023-07-28 DIAGNOSIS — N30.00 ACUTE CYSTITIS WITHOUT HEMATURIA: Primary | ICD-10-CM

## 2023-07-28 LAB
APPEARANCE UR: CLEAR
BACTERIA URNS QL MICRO: NEGATIVE /HPF
BILIRUB UR QL: NEGATIVE
COLOR UR: ABNORMAL
EPITH CASTS URNS QL MICRO: NORMAL /LPF (ref 0–5)
GLUCOSE UR STRIP.AUTO-MCNC: 100 MG/DL
HGB UR QL STRIP: NEGATIVE
KETONES UR QL STRIP.AUTO: ABNORMAL MG/DL
LEUKOCYTE ESTERASE UR QL STRIP.AUTO: NEGATIVE
NITRITE UR QL STRIP.AUTO: POSITIVE
PH UR STRIP: 5 (ref 5–8)
PROT UR STRIP-MCNC: ABNORMAL MG/DL
RBC #/AREA URNS HPF: NEGATIVE /HPF (ref 0–5)
SP GR UR REFRACTOMETRY: 1.01 (ref 1–1.03)
UROBILINOGEN UR QL STRIP.AUTO: 1 EU/DL (ref 0.2–1)
WBC URNS QL MICRO: NORMAL /HPF (ref 0–4)

## 2023-07-28 PROCEDURE — 81001 URINALYSIS AUTO W/SCOPE: CPT

## 2023-07-28 PROCEDURE — 87086 URINE CULTURE/COLONY COUNT: CPT

## 2023-07-28 PROCEDURE — 99283 EMERGENCY DEPT VISIT LOW MDM: CPT

## 2023-07-28 RX ORDER — CIPROFLOXACIN 500 MG/1
500 TABLET, FILM COATED ORAL 2 TIMES DAILY
Qty: 14 TABLET | Refills: 0 | Status: SHIPPED | OUTPATIENT
Start: 2023-07-28 | End: 2023-08-04

## 2023-07-28 ASSESSMENT — ENCOUNTER SYMPTOMS
GASTROINTESTINAL NEGATIVE: 1
BACK PAIN: 0

## 2023-07-28 ASSESSMENT — PAIN - FUNCTIONAL ASSESSMENT: PAIN_FUNCTIONAL_ASSESSMENT: NONE - DENIES PAIN

## 2023-07-28 ASSESSMENT — LIFESTYLE VARIABLES: HOW OFTEN DO YOU HAVE A DRINK CONTAINING ALCOHOL: NEVER

## 2023-07-28 NOTE — ED PROVIDER NOTES
[id number]  [phone]    In 2 days            Medication List        START taking these medications      ciprofloxacin 500 MG tablet  Commonly known as: CIPRO  Take 1 tablet by mouth 2 times daily for 7 days            ASK your doctor about these medications      D-Mannose 500 MG Caps     estrogens (conjugated) 0.625 MG tablet  Commonly known as: PREMARIN     fexofenadine 180 MG tablet  Commonly known as: ALLEGRA     fluticasone 50 MCG/ACT nasal spray  Commonly known as: FLONASE     hydroCHLOROthiazide 25 MG tablet  Commonly known as: HYDRODIURIL     levothyroxine 25 MCG tablet  Commonly known as: SYNTHROID     losartan 50 MG tablet  Commonly known as: COZAAR     nystatin 485475 UNIT/GM cream  Commonly known as: MYCOSTATIN     ondansetron 4 MG tablet  Commonly known as: ZOFRAN     Premarin 0.625 MG/GM Crea vaginal cream  Generic drug: estrogens conjugated     rosuvastatin 20 MG tablet  Commonly known as: CRESTOR     triamcinolone 0.1 % cream  Commonly known as: KENALOG               Where to Get Your Medications        These medications were sent to [facility] , [address] [place] [address],Building [facility] [address]      Phone: [phone]   ciprofloxacin 500 MG tablet          Dictation disclaimer:  Please note that this dictation was completed with Yooli, the computer voice recognition software. Quite often unanticipated grammatical, syntax, homophones, and other interpretive errors are inadvertently transcribed by the computer software. Please disregard these errors. Please excuse any errors that have escaped final proofreading.          KAI Ramos NP  07/28/23 9801

## 2023-07-28 NOTE — ED TRIAGE NOTES
Pt to ED for eval of pain with urination since last night. Pt reports she has had recurrent UTI's since 5/9. Last seen and dx'd with UTI 6/17, symptoms resolved with antibiotics and returned last night.

## 2023-07-30 LAB
BACTERIA SPEC CULT: ABNORMAL
CC UR VC: ABNORMAL
SERVICE CMNT-IMP: ABNORMAL

## 2023-07-31 LAB
BACTERIA SPEC CULT: ABNORMAL
CC UR VC: ABNORMAL
SERVICE CMNT-IMP: ABNORMAL

## 2023-08-23 ENCOUNTER — OFFICE VISIT (OUTPATIENT)
Age: 73
End: 2023-08-23

## 2023-08-23 VITALS
HEIGHT: 64 IN | OXYGEN SATURATION: 98 % | SYSTOLIC BLOOD PRESSURE: 136 MMHG | DIASTOLIC BLOOD PRESSURE: 74 MMHG | WEIGHT: 176 LBS | HEART RATE: 97 BPM | BODY MASS INDEX: 30.05 KG/M2

## 2023-08-23 DIAGNOSIS — R00.2 PALPITATIONS: Primary | ICD-10-CM

## 2023-08-23 DIAGNOSIS — R06.02 SOB (SHORTNESS OF BREATH): ICD-10-CM

## 2023-08-23 DIAGNOSIS — R42 VERTIGO: ICD-10-CM

## 2023-08-23 ASSESSMENT — PATIENT HEALTH QUESTIONNAIRE - PHQ9
1. LITTLE INTEREST OR PLEASURE IN DOING THINGS: 0
SUM OF ALL RESPONSES TO PHQ9 QUESTIONS 1 & 2: 0
SUM OF ALL RESPONSES TO PHQ QUESTIONS 1-9: 0
2. FEELING DOWN, DEPRESSED OR HOPELESS: 0

## 2023-08-23 NOTE — PROGRESS NOTES
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 Is someone accompanying this pt? no    Is the patient using any DME equipment during OV? no    Depression Screening:  Depression: Not at risk    PHQ-2 Score: 0        Learning Assessment:  Who is the primary learner? Patient    What is the preferred language for health care of the primary learner? ENGLISH    How does the primary learner prefer to learn new concepts? DEMONSTRATION    Answered By patient    Relationship to Learner SELF           Pt currently taking Anticoagulant therapy? no    Pt currently taking Antiplatelet therapy ? no      Coordination of Care:  1. Have you been to the ER, urgent care clinic since your last visit? Hospitalized since your last visit? no    2. Have you seen or consulted any other health care providers outside of the [address] [place] [address] since your last visit? Include any pap smears or colon screening.  no

## 2023-08-23 NOTE — PROGRESS NOTES
Marixa Lim    Referred for dizziness, SOB, palpitations. HPI    Julia Kirk is a 68 y.o. female referred from [facility] internal medicine for persistent symptoms of vertigo, abnormal EKG and palpitations. This patient has no known significant cardiac history but as you know had at least a moderate to severe case of COVID infection in August 2020. She said her symptoms were profoundly GI she lost her taste and smell had diarrhea and became severely dehydrated even needing to go to the ER for fluids. Since her illness she was a COVID long-hauler with persistent symptoms of fatigue and actually still has not regained her taste and smell. She was at her new baseline state of health when May 2023 she apparently got a UTI on the ninth. She says she started on antibiotics and 2 days later had an episode of severe vertigo. She assumed that it was from the antibiotics so the antibiotic was apparently changed but unfortunately the vertigo continued and recurred again. Presented back for follow-up this resulted in ENT referral cardiology referral and an MRI of her brain. She is  from her ex- and they lived apart for 3 yrs but he more recently moved back into the house as more a tenant.  She has 3 kids who support her but she does all her ADLs etc.      Current Outpatient Medications   Medication Sig Dispense Refill    triamcinolone (KENALOG) 0.1 % cream Apply topically 2 times daily      rosuvastatin (CRESTOR) 20 MG tablet Take 1 tablet by mouth at bedtime      ondansetron (ZOFRAN) 4 MG tablet Take 1 tablet by mouth every 8 hours as needed      nystatin (MYCOSTATIN) 592423 UNIT/GM cream Apply topically as needed      losartan (COZAAR) 50 MG tablet Take 1 tablet by mouth daily      levothyroxine (SYNTHROID) 25 MCG tablet Take 1 tablet by mouth Daily      hydroCHLOROthiazide (HYDRODIURIL) 25 MG tablet Take 1 tablet by mouth daily      fluticasone (FLONASE) 50 MCG/ACT nasal

## 2023-11-03 ENCOUNTER — OFFICE VISIT (OUTPATIENT)
Age: 73
End: 2023-11-03
Payer: MEDICARE

## 2023-11-03 VITALS
HEART RATE: 98 BPM | SYSTOLIC BLOOD PRESSURE: 138 MMHG | DIASTOLIC BLOOD PRESSURE: 68 MMHG | OXYGEN SATURATION: 98 % | HEIGHT: 64 IN | WEIGHT: 175 LBS | RESPIRATION RATE: 18 BRPM | BODY MASS INDEX: 29.88 KG/M2

## 2023-11-03 DIAGNOSIS — H81.10 BENIGN PAROXYSMAL POSITIONAL VERTIGO, UNSPECIFIED LATERALITY: Primary | ICD-10-CM

## 2023-11-03 PROCEDURE — 1090F PRES/ABSN URINE INCON ASSESS: CPT | Performed by: STUDENT IN AN ORGANIZED HEALTH CARE EDUCATION/TRAINING PROGRAM

## 2023-11-03 PROCEDURE — G8484 FLU IMMUNIZE NO ADMIN: HCPCS | Performed by: STUDENT IN AN ORGANIZED HEALTH CARE EDUCATION/TRAINING PROGRAM

## 2023-11-03 PROCEDURE — 99204 OFFICE O/P NEW MOD 45 MIN: CPT | Performed by: STUDENT IN AN ORGANIZED HEALTH CARE EDUCATION/TRAINING PROGRAM

## 2023-11-03 PROCEDURE — 1123F ACP DISCUSS/DSCN MKR DOCD: CPT | Performed by: STUDENT IN AN ORGANIZED HEALTH CARE EDUCATION/TRAINING PROGRAM

## 2023-11-03 PROCEDURE — G8400 PT W/DXA NO RESULTS DOC: HCPCS | Performed by: STUDENT IN AN ORGANIZED HEALTH CARE EDUCATION/TRAINING PROGRAM

## 2023-11-03 PROCEDURE — 1036F TOBACCO NON-USER: CPT | Performed by: STUDENT IN AN ORGANIZED HEALTH CARE EDUCATION/TRAINING PROGRAM

## 2023-11-03 PROCEDURE — G8417 CALC BMI ABV UP PARAM F/U: HCPCS | Performed by: STUDENT IN AN ORGANIZED HEALTH CARE EDUCATION/TRAINING PROGRAM

## 2023-11-03 PROCEDURE — 3017F COLORECTAL CA SCREEN DOC REV: CPT | Performed by: STUDENT IN AN ORGANIZED HEALTH CARE EDUCATION/TRAINING PROGRAM

## 2023-11-03 PROCEDURE — G8428 CUR MEDS NOT DOCUMENT: HCPCS | Performed by: STUDENT IN AN ORGANIZED HEALTH CARE EDUCATION/TRAINING PROGRAM

## 2023-11-03 RX ORDER — METHENAMINE HIPPURATE 1000 MG/1
TABLET ORAL
COMMUNITY
Start: 2023-09-11

## 2023-11-03 ASSESSMENT — ENCOUNTER SYMPTOMS
BACK PAIN: 1
VOMITING: 0
COUGH: 0
NAUSEA: 0
SHORTNESS OF BREATH: 0

## 2023-11-03 NOTE — PROGRESS NOTES
Jerrod Henley is a 68 y.o. female . presents for Dizziness and New Patient    A 68years old female patient with medical history of hypertension, hyperlipidemia, hypothyroidism, long COVID syndrome  referred here for evaluation of vertigo. Patient had COVID infection in August 2020. Symptoms were mostly GI: Nausea vomiting diarrhea, dehydration. Was seen at the [facility] emergency room. Since then, patient mentioned that she has lots of complications from the [facility]. Has lost her sense of smell and taste. Intermittent vertiginous symptoms since the COVID. Patient claims that it got worse in May 2023. Had severe episode of spinning sensation associated with nausea and vomiting. Episodes were lasting for 1 to 2 minutes. Triggered by head movement: Lifting her head up or when lying down in bed. Was seen by ENT; diagnosed with a sinus infection and completed 3 weeks course of antibiotics. Subsequently, was seen at another place: Procedures for managing vertigo were performed. It got better subsequently. But she still continues to have difficulty maintaining her balance. Might walk sideways; near falls. Was referred for vestibular therapy. Will be evaluated on November 21. Denied difficulty hearing. Sometimes she might hear a whooshing steady noise. No ear fullness. No ear pain or discharge. Every time, she gets COVID vaccination, she develops COVID-like symptoms including fever, back pain, chills, aching all over. She already had received 6 COVID vaccinations and the last one was last month. Does not have any significant changes in her vision. No diplopia. No extremity weakness. She has progressive worsening in her joint pain. Currently has joint pain all over. Might have some swelling in her hands. Has referral to see rheumatology. Takes over-the-counter medications. Joint pain has affected her walking.   No significant changes in her memory; but initially, had fogginess at the

## 2023-11-21 ENCOUNTER — HOSPITAL ENCOUNTER (OUTPATIENT)
Facility: HOSPITAL | Age: 73
Setting detail: RECURRING SERIES
Discharge: HOME | End: 2023-11-24
Payer: MEDICARE

## 2023-11-21 PROCEDURE — 97110 THERAPEUTIC EXERCISES: CPT

## 2023-11-21 PROCEDURE — 97161 PT EVAL LOW COMPLEX 20 MIN: CPT

## 2023-11-21 NOTE — THERAPY EVALUATION
[facility]  [facility] XN:[phone] Fx: [phone]  Plan of Care / Statement of Necessity for Physical Therapy Services     Patient Name: Izabel Saucedo : 1950   Medical   Diagnosis: Dizziness and giddiness [R42] Treatment Diagnosis: R26.89  Abnormalities of gait and mobility and R42   Dizziness and giddiness      Onset Date: May 2023 Payor :  Payor: Veryl Senters / Plan: MEDICARE PART A AND B / Product Type: *No Product type* /    Referral Source: Karen Mcmahon [facility]): 2023   Prior Hospitalization: See medical history Provider #: [id number]   Prior Level of Function: Ind with ambulation, Ind with ADLs   Comorbidities: Long COVID, arthritis, HTN, osteoporosis, foot pain     Assessment / key information:    Pt. Is a 68year old female c/o decreased balance and dizziness. She reports having some dizziness issues with her long hahunter COVID from 2020. In may she began to have severe room spinning dizziness with positional changes and was eventually diagnosed with BPPV and was treated for this by ENT. She reports now just having occasional dizziness and is mainly just feeling off balance. She continues to have a lot of difficulty with quick turns and sudden movements. She presents with negative smooth pursuits and saccades. VOR x1 was negative but reproduced her dizziness symptoms. [place]-Hallpike test was negative but she did have dizziness with return to starting positions. She has poor balance with Romberg with eyes closed at 4 seconds. FGA score was 6/30 indicating increased risk of falling. Skilled PT is medically necessary in order to improve dizziness and balance for increased ease of ambulation and improved quality of life.      Evaluation Complexity:  History:  MEDIUM  Complexity : 1-2 comorbidities / personal factors will impact the outcome/ POC ; Examination:  MEDIUM Complexity : 3

## 2023-11-21 NOTE — PROGRESS NOTES
applicable:            Details if applicable:            Details if applicable:            Details if applicable: Total    8 Total Reminder: MC/BC bill using total billable min of TIMED therapeutic procedures (example: do not include dry needle or estim unattended, both untimed codes, in totals to left)     [x]  Patient Education billed concurrently with other procedures       Physical Therapy Evaluation - Vestibular    Oculomotor Tests: (Fixation Not Blocked)       Ocular ROM:   [] WFL    [] Limited    Describe:       Spontaneous Nystag. [x] Neg     [] Pos    [] Left    [] Right       Gaze Holding Nystag. [x] Neg     [] Pos    [] Left    [] Right        Smooth Pursuit  [x] Neg     [] Pos    [] Left    [] Right        Saccades   [x] Neg     [] Pos    [] Left    [] Right        VOR - Slow Head Mvmt [] Neg     [x] Pos    [] Left    [] Right        VOR - Fast Head Mvmt [] Neg     [] Pos    [] Left    [] Right        Head Thrust  [] Neg     [] Pos    [] Left    [] Right        Static Visual Acuity [] Neg     [] Pos    [] Left    [] Right        Dynamic Visual Acuity [] Neg     [] Pos    [] Left    [] Right     Other Special Tests:       Vertebral Artery Testing [x] Neg     [] Pos    [x] Left    [x] Right       Hallpike-Gia Maneuver [x] Neg     [] Pos    [x] Left    [x] Right       Roll Test   [] Neg     [] Pos    [] Left    [] Right       Kim Balance Scale [] Neg     [] Pos    Score:       Dynamic Gait Index [] Neg     [] Pos    Score:       Functional Gait Assess. [] Neg     [x] Pos    Score: 6/30    Computerized Dynamic Posturography:        [] Not Tested    [] WFL    Score:     Other Tests:  Romberg EC: 4 seconds  Tandem left; 16 seconds right: 21 seconds  30 second sit to stand test: 9x       Pain Level (0-10 scale) post treatment: 0/10       [x]  See Plan of Care for goals and assessment     PLAN  []  Upgrade activities as tolerated     [x]  Continue plan of care  []  Update interventions per flow sheet

## 2023-11-28 ENCOUNTER — HOSPITAL ENCOUNTER (OUTPATIENT)
Facility: HOSPITAL | Age: 73
Setting detail: RECURRING SERIES
Discharge: HOME | End: 2023-12-01
Payer: MEDICARE

## 2023-11-28 PROCEDURE — 97110 THERAPEUTIC EXERCISES: CPT

## 2023-11-28 PROCEDURE — 97112 NEUROMUSCULAR REEDUCATION: CPT

## 2023-11-28 NOTE — PROGRESS NOTES
good effort with all exercises. Pt reported compliance with HEP    Patient will continue to benefit from skilled PT / OT services to modify and progress therapeutic interventions, analyze and address functional mobility deficits, analyze and address ROM deficits, analyze and address strength deficits, analyze and cue for proper movement patterns, analyze and modify for postural abnormalities, analyze and address imbalance/dizziness, and instruct in home and community integration to address functional deficits and attain remaining goals. Progress toward goals / Updated goals:  [x]  See Progress Note/Recertification    Short Term Goals: To be accomplished in 4 weeks  Goal: Patient will demonstrate compliance with HEP in order to improve balance for increased ease of ADLs. Status at evaluation/last progress note: n/a  Goal met. Pt reported compliance. 11/28/23     2. Goal: Patient will report a 25% improvement in symptoms since [facility] in order to improve quality of life   Status at evaluation/last progress note: n/a     Long Term Goals: To be accomplished in 12 weeks  Goal: Patient will improve FOTO score by 11 points in order to demonstrate a significant improvement in function. Status at evaluation/last progress note: 42 points     2. Goal: Patient will improve FGA score by 9 points in order to demonstrate a significant improvement with balance during ambulation. Status at evaluation/last progress note: 6/30     3. Goal: Patient will improve Romberg eyes closed balance to 30 seconds in order to increase safety with ADLs. Status at evaluation/last progress note: 4 seconds     4. Goal: Patient will report a 75% improvement in symptoms since [facility] in order to improve quality of life.    Status at evaluation/last progress note:    Next PN/ RC due 89/03/71, cert 7/12/92  Auth [facility] activities as tolerated    Thao VeraInessa, PTA    11/28/2023    6:36

## 2023-11-30 ENCOUNTER — HOSPITAL ENCOUNTER (OUTPATIENT)
Facility: HOSPITAL | Age: 73
Setting detail: RECURRING SERIES
End: 2023-11-30
Payer: MEDICARE

## 2023-11-30 PROCEDURE — 97112 NEUROMUSCULAR REEDUCATION: CPT

## 2023-11-30 PROCEDURE — 97110 THERAPEUTIC EXERCISES: CPT

## 2023-11-30 NOTE — PROGRESS NOTES
PHYSICAL / OCCUPATIONAL THERAPY - DAILY TREATMENT NOTE (updated )    Patient Name: Geneva Cost    Date: 2023    : 1950  Insurance: Payor: MEDICARE / Plan: MEDICARE PART A AND B / Product Type: *No Product type* /      Patient  verified Yes     Visit #   Current / Total 3 24   Time   In / Out [id number]   Pain   In / Out 0/10 0/10   Subjective Functional Status/Changes: Pt stated that her toes hurt some, but is better     TREATMENT AREA =  Dizziness and giddiness [R42]    OBJECTIVE         Therapeutic Procedures: Tx Min Billable or 1:1 Min (if diff from Tx Min) Procedure, Rationale, Specifics   23  74716 Therapeutic Exercise (timed):  increase ROM, strength, coordination, balance, and proprioception to improve patient's ability to progress to PLOF and address remaining functional goals. (see flow sheet as applicable)     Details if applicable:         35336 Neuromuscular Re-Education (timed):  improve balance, coordination, kinesthetic sense, posture, core stability and proprioception to improve patient's ability to develop conscious control of individual muscles and awareness of position of extremities in order to progress to PLOF and address remaining functional goals. (see flow sheet as applicable)     Details if applicable:     37  [facility] Totals Reminder: bill using total billable min of TIMED therapeutic procedures (example: do not include dry needle or estim unattended, both untimed codes, in totals to left)  8-22 min = 1 unit; 23-37 min = 2 units; 38-52 min = 3 units; 53-67 min = 4 units; 68-82 min = 5 units   Total Total     [x]  Patient Education billed concurrently with other procedures   [x] Review HEP    [] Progressed/Changed HEP, detail:    [] Other detail:       Objective Information/Functional Measures/Assessment  Much better balance with static balance today  Added sit to stands  No difficulty with exercises    Pt is making slow progress toward goals.  Pt cont with mild

## 2023-12-05 ENCOUNTER — HOSPITAL ENCOUNTER (OUTPATIENT)
Facility: HOSPITAL | Age: 73
Setting detail: RECURRING SERIES
Discharge: HOME | End: 2023-12-08
Payer: MEDICARE

## 2023-12-05 PROCEDURE — 97110 THERAPEUTIC EXERCISES: CPT

## 2023-12-05 PROCEDURE — 97112 NEUROMUSCULAR REEDUCATION: CPT

## 2023-12-05 NOTE — PROGRESS NOTES
PHYSICAL / OCCUPATIONAL THERAPY - DAILY TREATMENT NOTE (updated )    Patient Name: Amanda Round    Date: 2023    : 1950  Insurance: Payor: MEDICARE / Plan: MEDICARE PART A AND B / Product Type: *No Product type* /      Patient  verified Yes     Visit #   Current / Total 4 24   Time   In / Out [phone]   Pain   In / Out 5/10 4/10   Subjective Functional Status/Changes: Pt stated that her toes have been really painful. Is getting CT scan on Friday      TREATMENT AREA =  Dizziness and giddiness [R42]    OBJECTIVE         Therapeutic Procedures: Tx Min Billable or 1:1 Min (if diff from Tx Min) Procedure, Rationale, Specifics   28  53474 Therapeutic Exercise (timed):  increase ROM, strength, coordination, balance, and proprioception to improve patient's ability to progress to PLOF and address remaining functional goals. (see flow sheet as applicable)     Details if applicable:       10  24607 Neuromuscular Re-Education (timed):  improve balance, coordination, kinesthetic sense, posture, core stability and proprioception to improve patient's ability to develop conscious control of individual muscles and awareness of position of extremities in order to progress to PLOF and address remaining functional goals.  (see flow sheet as applicable)     Details if applicable:     45  [facility] BC Totals Reminder: bill using total billable min of TIMED therapeutic procedures (example: do not include dry needle or estim unattended, both untimed codes, in totals to left)  8-22 min = 1 unit; 23-37 min = 2 units; 38-52 min = 3 units; 53-67 min = 4 units; 68-82 min = 5 units   Total Total     [x]  Patient Education billed concurrently with other procedures   [x] Review HEP    [] Progressed/Changed HEP, detail:    [] Other detail:       Objective Information/Functional Measures/Assessment  Slight LOB with foam balance due to painful toes on the right foot  Added 2 cone tap today    Pt is making slow progress toward

## 2023-12-07 ENCOUNTER — HOSPITAL ENCOUNTER (OUTPATIENT)
Facility: HOSPITAL | Age: 73
Setting detail: RECURRING SERIES
Discharge: HOME | End: 2023-12-10
Payer: MEDICARE

## 2023-12-07 PROCEDURE — 97112 NEUROMUSCULAR REEDUCATION: CPT

## 2023-12-07 PROCEDURE — 97110 THERAPEUTIC EXERCISES: CPT

## 2023-12-07 NOTE — PROGRESS NOTES
PHYSICAL / OCCUPATIONAL THERAPY - DAILY TREATMENT NOTE (updated )    Patient Name: Moy Levine    Date: 2023    : 1950  Insurance: Payor: MEDICARE / Plan: MEDICARE PART A AND B / Product Type: *No Product type* /      Patient  verified Yes     Visit #   Current / Total 5 24   Time   In / Out [phone]   Pain   In / Out 5/10 6/10   Subjective Functional Status/Changes: Pt stated that everything hurts today due to the cold weather. TREATMENT AREA =  Dizziness and giddiness [R42]    OBJECTIVE         Therapeutic Procedures: Tx Min Billable or 1:1 Min (if diff from Tx Min) Procedure, Rationale, Specifics   28  56194 Therapeutic Exercise (timed):  increase ROM, strength, coordination, balance, and proprioception to improve patient's ability to progress to PLOF and address remaining functional goals. (see flow sheet as applicable)     Details if applicable:       10  24766 Neuromuscular Re-Education (timed):  improve balance, coordination, kinesthetic sense, posture, core stability and proprioception to improve patient's ability to develop conscious control of individual muscles and awareness of position of extremities in order to progress to PLOF and address remaining functional goals. (see flow sheet as applicable)     Details if applicable:     45  [facility] Totals Reminder: bill using total billable min of TIMED therapeutic procedures (example: do not include dry needle or estim unattended, both untimed codes, in totals to left)  8-22 min = 1 unit; 23-37 min = 2 units; 38-52 min = 3 units; 53-67 min = 4 units; 68-82 min = 5 units   Total Total     [x]  Patient Education billed concurrently with other procedures   [x] Review HEP    [] Progressed/Changed HEP, detail:    [] Other detail:       Objective Information/Functional Measures/Assessment  Made increases today per flow sheet  No difficulty with increases made today    Pt is making slow progress toward goals.  Pt cont with moderate

## 2023-12-11 ENCOUNTER — HOSPITAL ENCOUNTER (OUTPATIENT)
Facility: HOSPITAL | Age: 73
Setting detail: RECURRING SERIES
Discharge: HOME | End: 2023-12-14
Payer: MEDICARE

## 2023-12-11 PROCEDURE — 97112 NEUROMUSCULAR REEDUCATION: CPT

## 2023-12-11 PROCEDURE — 97110 THERAPEUTIC EXERCISES: CPT

## 2023-12-11 NOTE — PROGRESS NOTES
PHYSICAL / OCCUPATIONAL THERAPY - DAILY TREATMENT NOTE (updated )    Patient Name: Jose Carlos Harper    Date: 2023    : 1950  Insurance: Payor: MEDICARE / Plan: MEDICARE PART A AND B / Product Type: *No Product type* /      Patient  verified Yes     Visit #   Current / Total 6 24   Time   In / Out 920 958   Pain   In / Out 5 5   Subjective Functional Status/Changes: Having trouble with temperatue change. She does not have broken toes, but a severe form of  Arthritis. TREATMENT AREA =  Dizziness and giddiness [R42]    OBJECTIVE         Therapeutic Procedures: Tx Min Billable or 1:1 Min (if diff from Tx Min) Procedure, Rationale, Specifics   30  [id number] Neuromuscular Re-Education (timed):  improve balance, coordination, kinesthetic sense, posture, core stability and proprioception to improve patient's ability to develop conscious control of individual muscles and awareness of position of extremities in order to progress to PLOF and address remaining functional goals. (see flow sheet as applicable)     Details if applicable:       8  50417 Therapeutic Exercise (timed):  increase ROM, strength, coordination, balance, and proprioception to improve patient's ability to progress to PLOF and address remaining functional goals.  (see flow sheet as applicable)     Details if applicable:     45  [facility] Totals Reminder: bill using total billable min of TIMED therapeutic procedures (example: do not include dry needle or estim unattended, both untimed codes, in totals to left)  8-22 min = 1 unit; 23-37 min = 2 units; 38-52 min = 3 units; 53-67 min = 4 units; 68-82 min = 5 units   Total Total     [x]  Patient Education billed concurrently with other procedures   [x] Review HEP    [] Progressed/Changed HEP, detail:    [] Other detail:       Objective Information/Functional Measures/Assessment    SOT=All sensory input Below Avg  Composite score=36  Tolerated standing in Machine for Posturography

## 2024-01-08 ENCOUNTER — HOSPITAL ENCOUNTER (OUTPATIENT)
Facility: HOSPITAL | Age: 74
Setting detail: RECURRING SERIES
Discharge: HOME | End: 2024-01-11
Payer: MEDICARE

## 2024-01-08 PROCEDURE — 97112 NEUROMUSCULAR REEDUCATION: CPT

## 2024-01-08 PROCEDURE — 97110 THERAPEUTIC EXERCISES: CPT

## 2024-01-08 NOTE — PROGRESS NOTES
PHYSICAL / OCCUPATIONAL THERAPY - DAILY TREATMENT NOTE    Patient Name: Marixa Lim    Date: 2024    : 1950  Insurance: Payor: MEDICARE / Plan: MEDICARE PART A AND B / Product Type: *No Product type* /      Patient  verified Yes     Visit #   Current / Total 9 24   Time   In / Out 8:40 9:20   Pain   In / Out 6/10 6/10   Subjective Functional Status/Changes: Pt. Reports she [place] airport but had some pain and soreness after.      TREATMENT AREA =  Dizziness and giddiness [R42]    OBJECTIVE    Therapeutic Procedures:    Tx Min Billable or 1:1 Min (if diff from Tx Min) Procedure, Rationale, Specifics   10  48469 Therapeutic Exercise (timed):  increase ROM, strength, coordination, balance, and proprioception to improve patient's ability to progress to PLOF and address remaining functional goals. (see flow sheet as applicable)     Details if applicable:  see flow sheet     30  22659 Neuromuscular Re-Education (timed):  improve balance, coordination, kinesthetic sense, posture, core stability and proprioception to improve patient's ability to develop conscious control of individual muscles and awareness of position of extremities in order to progress to PLOF and address remaining functional goals. (see flow sheet as applicable)     Details if applicable:  VORx1, standing balance activities. Ambulation with head turns, backward walking, tandem walk          Details if applicable:            Details if applicable:            Details if applicable:     40  [facility] Totals Reminder: bill using total billable min of TIMED therapeutic procedures (example: do not include dry needle or estim unattended, both untimed codes, in totals to left)  8-22 min = 1 unit; 23-37 min = 2 units; 38-52 min = 3 units; 53-67 min = 4 units; 68-82 min = 5 units   Total Total     [x]  Patient Education billed concurrently with other procedures   [x] Review HEP    [] Progressed/Changed HEP, detail:    []

## 2024-01-11 ENCOUNTER — HOSPITAL ENCOUNTER (OUTPATIENT)
Facility: HOSPITAL | Age: 74
Setting detail: RECURRING SERIES
Discharge: HOME | End: 2024-01-14
Payer: MEDICARE

## 2024-01-11 PROCEDURE — 97110 THERAPEUTIC EXERCISES: CPT

## 2024-01-11 PROCEDURE — 97112 NEUROMUSCULAR REEDUCATION: CPT

## 2024-01-11 NOTE — PROGRESS NOTES
Appointments   Date Time Provider Department Center   1/11/2024  8:40 AM Jermain Patel, SHEA [facility]   1/17/2024  8:40 AM Jermain Patel PT [facility]   1/19/2024  8:40 AM Ely Whitt, PT [facility]   1/22/2024  8:40 AM Jermain Patel PT [facility]   1/25/2024  8:40 AM Jermain Patel PT [facility]   1/29/2024  8:40 AM Jermain Patel PT [facility]   1/31/2024  8:40 AM Jermain Patel PT [facility]   2/29/2024 10:00 AM Chanel Piedra DO [facility]

## 2024-01-17 ENCOUNTER — HOSPITAL ENCOUNTER (OUTPATIENT)
Facility: HOSPITAL | Age: 74
Setting detail: RECURRING SERIES
Discharge: HOME | End: 2024-01-20
Payer: MEDICARE

## 2024-01-17 PROCEDURE — 97110 THERAPEUTIC EXERCISES: CPT

## 2024-01-17 PROCEDURE — 97112 NEUROMUSCULAR REEDUCATION: CPT

## 2024-01-17 PROCEDURE — 97530 THERAPEUTIC ACTIVITIES: CPT

## 2024-01-17 NOTE — PROGRESS NOTES
PHYSICAL / OCCUPATIONAL THERAPY - DAILY TREATMENT NOTE    Patient Name: Marixa Lim    Date: 2024    : 1950  Insurance: Payor: MEDICARE / Plan: MEDICARE PART A AND B / Product Type: *No Product type* /      Patient  verified Yes     Visit #   Current / Total 11 24   Time   In / Out 8:40 9:20   Pain   In / Out 6/10 6/10   Subjective Functional Status/Changes: Pt. Reports her foot is bothering her today but she doesn't have any dizziness today     TREATMENT AREA =  Dizziness and giddiness [R42]    OBJECTIVE    Therapeutic Procedures:    Tx Min Billable or 1:1 Min (if diff from Tx Min) Procedure, Rationale, Specifics   12  03350 Therapeutic Exercise (timed):  increase ROM, strength, coordination, balance, and proprioception to improve patient's ability to progress to PLOF and address remaining functional goals. (see flow sheet as applicable)     Details if applicable:  see flow sheet     18  44180 Neuromuscular Re-Education (timed):  improve balance, coordination, kinesthetic sense, posture, core stability and proprioception to improve patient's ability to develop conscious control of individual muscles and awareness of position of extremities in order to progress to PLOF and address remaining functional goals. (see flow sheet as applicable)     Details if applicable:  VOR activities, standing balance activities    10  49715 Therapeutic Activity (timed):  use of dynamic activities replicating functional movements to increase ROM, strength, coordination, balance, and proprioception in order to improve patient's ability to progress to PLOF and address remaining functional goals.  (see flow sheet as applicable)     Details if applicable:  goal re-assessment           Details if applicable:            Details if applicable:     40  [facility] Totals Reminder: bill using total billable min of TIMED therapeutic procedures (example: do not include dry needle or estim unattended, both untimed codes, in

## 2024-01-17 NOTE — THERAPY RECERTIFICATION
Reporting Period: (date from last Prog Note/Eval to current Prog Note/Recert)  12/19/23 - 1/17/24    Remaining unmet goals from POC:  Goal: Patient will improve FOTO score by 11 points in order to demonstrate a significant improvement in function.   Status at evaluation/last progress note:    2.   Goal: Patient will report a 75% improvement in symptoms since SOC in order to improve quality of life.  Status at evaluation/last progress note: 50%    RECOMMENDATIONS  Continue therapy per initial Plan of Care or most recent Medicare Recert.      If you have any questions/comments please contact us directly.  Thank you for allowing us to assist in the care of your patient.    Jermain Patel, PT       1/17/2024       7:53 AM

## 2024-01-19 ENCOUNTER — HOSPITAL ENCOUNTER (OUTPATIENT)
Facility: HOSPITAL | Age: 74
Setting detail: RECURRING SERIES
Discharge: HOME | End: 2024-01-22
Payer: MEDICARE

## 2024-01-19 PROCEDURE — 97530 THERAPEUTIC ACTIVITIES: CPT

## 2024-01-19 PROCEDURE — 97112 NEUROMUSCULAR REEDUCATION: CPT

## 2024-01-19 NOTE — PROGRESS NOTES
PHYSICAL / OCCUPATIONAL THERAPY - DAILY TREATMENT NOTE    Patient Name: Marixa Lim    Date: 2024    : 1950  Insurance: Payor: MEDICARE / Plan: MEDICARE PART A AND B / Product Type: *No Product type* /      Patient  verified Yes     Visit #   Current / Total 12 24   Time   In / Out 840 920   Pain   In / Out 5 right foot 5   Subjective Functional Status/Changes: Reports her right foot hurts from Arthritis.      TREATMENT AREA =  Dizziness and giddiness [R42]    OBJECTIVE         Therapeutic Procedures:    Tx Min Billable or 1:1 Min (if diff from Tx Min) Procedure, Rationale, Specifics   25  35775 Neuromuscular Re-Education (timed):  improve balance, coordination, kinesthetic sense, posture, core stability and proprioception to improve patient's ability to develop conscious control of individual muscles and awareness of position of extremities in order to progress to PLOF and address remaining functional goals. (see flow sheet as applicable)     Details if applicable:       15  49993 Therapeutic Activity (timed):  use of dynamic activities replicating functional movements to increase ROM, strength, coordination, balance, and proprioception in order to improve patient's ability to progress to PLOF and address remaining functional goals.  (see flow sheet as applicable)     Details if applicable:            Details if applicable:            Details if applicable:            Details if applicable:     40  [facility] Totals Reminder: bill using total billable min of TIMED therapeutic procedures (example: do not include dry needle or estim unattended, both untimed codes, in totals to left)  8-22 min = 1 unit; 23-37 min = 2 units; 38-52 min = 3 units; 53-67 min = 4 units; 68-82 min = 5 units   Total Total     [x]  Patient Education billed concurrently with other procedures   [x] Review HEP    [] Progressed/Changed HEP, detail:    [] Other detail:       Objective Information/Functional

## 2024-01-22 ENCOUNTER — HOSPITAL ENCOUNTER (OUTPATIENT)
Facility: HOSPITAL | Age: 74
Setting detail: RECURRING SERIES
Discharge: HOME | End: 2024-01-25
Payer: MEDICARE

## 2024-01-22 PROCEDURE — 97110 THERAPEUTIC EXERCISES: CPT

## 2024-01-22 PROCEDURE — 97112 NEUROMUSCULAR REEDUCATION: CPT

## 2024-01-22 NOTE — PROGRESS NOTES
PHYSICAL / OCCUPATIONAL THERAPY - DAILY TREATMENT NOTE    Patient Name: Marixa Lim    Date: 2024    : 1950  Insurance: Payor: MEDICARE / Plan: MEDICARE PART A AND B / Product Type: *No Product type* /      Patient  verified Yes     Visit #   Current / Total 13 24   Time   In / Out 8:35 9:14   Pain   In / Out 6/10 6/10   Subjective Functional Status/Changes: Pt. Reports her pain is about the same today but has better balance. She reports being able to clean the drawer under her stove over the weekend.      TREATMENT AREA =  Dizziness and giddiness [R42]    OBJECTIVE    Therapeutic Procedures:    Tx Min Billable or 1:1 Min (if diff from Tx Min) Procedure, Rationale, Specifics   15  52396 Therapeutic Exercise (timed):  increase ROM, strength, coordination, balance, and proprioception to improve patient's ability to progress to PLOF and address remaining functional goals. (see flow sheet as applicable)     Details if applicable:  see flow sheet     24  67592 Neuromuscular Re-Education (timed):  improve balance, coordination, kinesthetic sense, posture, core stability and proprioception to improve patient's ability to develop conscious control of individual muscles and awareness of position of extremities in order to progress to PLOF and address remaining functional goals. (see flow sheet as applicable)     Details if applicable:  VOR exercises, standing balance activities           Details if applicable:            Details if applicable:            Details if applicable:     39  [facility] Totals Reminder: bill using total billable min of TIMED therapeutic procedures (example: do not include dry needle or estim unattended, both untimed codes, in totals to left)  8-22 min = 1 unit; 23-37 min = 2 units; 38-52 min = 3 units; 53-67 min = 4 units; 68-82 min = 5 units   Total Total     [x]  Patient Education billed concurrently with other procedures   [x] Review HEP    [] Progressed/Changed HEP, detail:

## 2024-01-25 ENCOUNTER — HOSPITAL ENCOUNTER (OUTPATIENT)
Facility: HOSPITAL | Age: 74
Setting detail: RECURRING SERIES
Discharge: HOME | End: 2024-01-28
Payer: MEDICARE

## 2024-01-25 PROCEDURE — 97112 NEUROMUSCULAR REEDUCATION: CPT

## 2024-01-25 PROCEDURE — 97110 THERAPEUTIC EXERCISES: CPT

## 2024-01-25 NOTE — PROGRESS NOTES
PHYSICAL / OCCUPATIONAL THERAPY - DAILY TREATMENT NOTE    Patient Name: Marixa Lim    Date: 2024    : 1950  Insurance: Payor: MEDICARE / Plan: MEDICARE PART A AND B / Product Type: *No Product type* /      Patient  verified Yes     Visit #   Current / Total 14 24   Time   In / Out 8:39 9:20   Pain   In / Out 6/10 6/10   Subjective Functional Status/Changes: Pt. Reports still having a lot of foot pain and she is hoping to get a referral to a foot doctor to see if there is anything they can do.      TREATMENT AREA =  Dizziness and giddiness [R42]    OBJECTIVE    Therapeutic Procedures:    Tx Min Billable or 1:1 Min (if diff from Tx Min) Procedure, Rationale, Specifics   26  40662 Therapeutic Exercise (timed):  increase ROM, strength, coordination, balance, and proprioception to improve patient's ability to progress to PLOF and address remaining functional goals. (see flow sheet as applicable)     Details if applicable:  see flow sheet     15  95519 Neuromuscular Re-Education (timed):  improve balance, coordination, kinesthetic sense, posture, core stability and proprioception to improve patient's ability to develop conscious control of individual muscles and awareness of position of extremities in order to progress to PLOF and address remaining functional goals. (see flow sheet as applicable)     Details if applicable:  VOR activities, standing balance activities, tandem walking, ambulation with head turns           Details if applicable:            Details if applicable:            Details if applicable:     41  [facility] Totals Reminder: bill using total billable min of TIMED therapeutic procedures (example: do not include dry needle or estim unattended, both untimed codes, in totals to left)  8-22 min = 1 unit; 23-37 min = 2 units; 38-52 min = 3 units; 53-67 min = 4 units; 68-82 min = 5 units   Total Total     [x]  Patient Education billed concurrently with other procedures   [x] Review HEP

## 2024-01-29 ENCOUNTER — HOSPITAL ENCOUNTER (OUTPATIENT)
Facility: HOSPITAL | Age: 74
Setting detail: RECURRING SERIES
Discharge: HOME | End: 2024-02-01
Payer: MEDICARE

## 2024-01-29 PROCEDURE — 97112 NEUROMUSCULAR REEDUCATION: CPT

## 2024-01-29 PROCEDURE — 97110 THERAPEUTIC EXERCISES: CPT

## 2024-01-29 NOTE — PROGRESS NOTES
PHYSICAL / OCCUPATIONAL THERAPY - DAILY TREATMENT NOTE    Patient Name: Marixa Lim    Date: 2024    : 1950  Insurance: Payor: MEDICARE / Plan: MEDICARE PART A AND B / Product Type: *No Product type* /      Patient  verified Yes     Visit #   Current / Total 15 24   Time   In / Out 8:40 9:20   Pain   In / Out 6/10 6/10   Subjective Functional Status/Changes: Pt. Reports she is doing good today except for her foot. She has been working on her HEP     TREATMENT AREA =  Dizziness and giddiness [R42]    OBJECTIVE    Therapeutic Procedures:    Tx Min Billable or 1:1 Min (if diff from Tx Min) Procedure, Rationale, Specifics   15  91469 Therapeutic Exercise (timed):  increase ROM, strength, coordination, balance, and proprioception to improve patient's ability to progress to PLOF and address remaining functional goals. (see flow sheet as applicable)     Details if applicable:  see flow sheet     25  65568 Neuromuscular Re-Education (timed):  improve balance, coordination, kinesthetic sense, posture, core stability and proprioception to improve patient's ability to develop conscious control of individual muscles and awareness of position of extremities in order to progress to PLOF and address remaining functional goals. (see flow sheet as applicable)     Details if applicable:  VOR activities, standing balance activities           Details if applicable:            Details if applicable:            Details if applicable:     40  [facility] Totals Reminder: bill using total billable min of TIMED therapeutic procedures (example: do not include dry needle or estim unattended, both untimed codes, in totals to left)  8-22 min = 1 unit; 23-37 min = 2 units; 38-52 min = 3 units; 53-67 min = 4 units; 68-82 min = 5 units   Total Total     [x]  Patient Education billed concurrently with other procedures   [x] Review HEP    [] Progressed/Changed HEP, detail:    [] Other detail:       Objective Information/Functional

## 2024-01-31 ENCOUNTER — HOSPITAL ENCOUNTER (OUTPATIENT)
Facility: HOSPITAL | Age: 74
Setting detail: RECURRING SERIES
Discharge: HOME | End: 2024-02-03
Payer: MEDICARE

## 2024-01-31 PROCEDURE — 97112 NEUROMUSCULAR REEDUCATION: CPT

## 2024-01-31 PROCEDURE — 97110 THERAPEUTIC EXERCISES: CPT

## 2024-01-31 NOTE — PROGRESS NOTES
PHYSICAL / OCCUPATIONAL THERAPY - DAILY TREATMENT NOTE    Patient Name: Marixa Lim    Date: 2024    : 1950  Insurance: Payor: MEDICARE / Plan: MEDICARE PART A AND B / Product Type: *No Product type* /      Patient  verified Yes     Visit #   Current / Total 16 24   Time   In / Out 8:39 9:20   Pain   In / Out 5/10 5/10   Subjective Functional Status/Changes: Pt. Reports she is feeling pretty good today. She is ready for D/C today.      TREATMENT AREA =  Dizziness and giddiness [R42]    OBJECTIVE  Therapeutic Procedures:    Tx Min Billable or 1:1 Min (if diff from Tx Min) Procedure, Rationale, Specifics   10  23877 Therapeutic Exercise (timed):  increase ROM, strength, coordination, balance, and proprioception to improve patient's ability to progress to PLOF and address remaining functional goals. (see flow sheet as applicable)     Details if applicable:  see flow sheet     31  72698 Neuromuscular Re-Education (timed):  improve balance, coordination, kinesthetic sense, posture, core stability and proprioception to improve patient's ability to develop conscious control of individual muscles and awareness of position of extremities in order to progress to PLOF and address remaining functional goals. (see flow sheet as applicable)     Details if applicable:  standing balance activities, ambulation with head turns, tandem walking          Details if applicable:            Details if applicable:            Details if applicable:     41  [facility] Totals Reminder: bill using total billable min of TIMED therapeutic procedures (example: do not include dry needle or estim unattended, both untimed codes, in totals to left)  8-22 min = 1 unit; 23-37 min = 2 units; 38-52 min = 3 units; 53-67 min = 4 units; 68-82 min = 5 units   Total Total     [x]  Patient Education billed concurrently with other procedures   [x] Review HEP    [] Progressed/Changed HEP, detail:    [] Other detail:       Objective

## 2024-01-31 NOTE — PROGRESS NOTES
Physical Therapy Discharge Instructions      [facility] - [facility]  [address] [place] [address] [phone] fax    Patient: Marixa Lim  : 1950      Continue Home Exercise Program 2 times per day for 4 weeks, then decrease to 3 times per week      Continue with    [x] Ice  as needed      [x] Heat           Follow up with MD:     [] Upon completion of therapy     [x] As needed    Recommendations:     [x]   Return to activity with home program    []   Return to activity with the following modifications:       []Post Rehab Program    []Join Independent aquatic program     []Return to/join local gym      Additional Comments: Keep up the great work at home!

## 2024-01-31 NOTE — THERAPY DISCHARGE
[facility] - [facility]  [address] [place] [address]  [phone] fax    Physical Therapy Discharge Summary    Patient Name: Marixa Lim : 1950   Medical   Diagnosis: Dizziness and giddiness [R42] Treatment Diagnosis: R26.89  Abnormalities of gait and mobility and R42   Dizziness and giddiness      Onset Date: May 2023 Payor :  Payor: MEDICARE / Plan: MEDICARE PART A AND B / Product Type: *No Product type* /    Referral Source: Taiwo Shanks PA Start of Care (SOC): 2023   Prior Hospitalization: See medical history Provider #: [id number]   Prior Level of Function: Ind with ambulation, Ind with ADLs   Comorbidities: Long COVID, arthritis, HTN, osteoporosis, foot pain     Visits from Start of Care: 16    Missed Visits: 0    Reporting Period : 24 to 24    Summary of Care:  Goal: Patient will improve FOTO score by 11 points in order to demonstrate a significant improvement in function.   Status at evaluation/last progress note: 47 points  Status at discharge: met     Goal: Patient will report a 75% improvement in symptoms since SOC in order to improve quality of life.  Status at evaluation/last progress note: 50%  Status at discharge: not met    Pt. Has progressed well with physical therapy. She reports a 65% improvement in symptoms since SOC. FOTO score improved significantly to 65 points. She no longer is having dizziness symptoms. Currently she is most limited by her foot pain and she has an appointment scheduled with a doctor for this. She demonstrates good understanding of her HEP and was educated on continuing with this following D/C.     ASSESSMENT/RECOMMENDATIONS:  [x]Discontinue therapy: [x]Patient has reached or is progressing toward set goals      []Patient is non-compliant or has abdicated      []Due to lack of appreciable progress towards set goals    Jermain Patel, PT 2024 6:59 AM

## 2024-02-29 ENCOUNTER — OFFICE VISIT (OUTPATIENT)
Age: 74
End: 2024-02-29
Payer: MEDICARE

## 2024-02-29 VITALS
HEART RATE: 97 BPM | SYSTOLIC BLOOD PRESSURE: 136 MMHG | WEIGHT: 177 LBS | BODY MASS INDEX: 30.22 KG/M2 | HEIGHT: 64 IN | DIASTOLIC BLOOD PRESSURE: 72 MMHG | OXYGEN SATURATION: 98 %

## 2024-02-29 DIAGNOSIS — R00.2 PALPITATIONS: Primary | ICD-10-CM

## 2024-02-29 DIAGNOSIS — R06.02 SOB (SHORTNESS OF BREATH): ICD-10-CM

## 2024-02-29 DIAGNOSIS — R60.9 SWELLING: ICD-10-CM

## 2024-02-29 PROCEDURE — G8484 FLU IMMUNIZE NO ADMIN: HCPCS | Performed by: INTERNAL MEDICINE

## 2024-02-29 PROCEDURE — 3017F COLORECTAL CA SCREEN DOC REV: CPT | Performed by: INTERNAL MEDICINE

## 2024-02-29 PROCEDURE — 1036F TOBACCO NON-USER: CPT | Performed by: INTERNAL MEDICINE

## 2024-02-29 PROCEDURE — G8400 PT W/DXA NO RESULTS DOC: HCPCS | Performed by: INTERNAL MEDICINE

## 2024-02-29 PROCEDURE — 99214 OFFICE O/P EST MOD 30 MIN: CPT | Performed by: INTERNAL MEDICINE

## 2024-02-29 PROCEDURE — G8417 CALC BMI ABV UP PARAM F/U: HCPCS | Performed by: INTERNAL MEDICINE

## 2024-02-29 PROCEDURE — G8427 DOCREV CUR MEDS BY ELIG CLIN: HCPCS | Performed by: INTERNAL MEDICINE

## 2024-02-29 PROCEDURE — 1090F PRES/ABSN URINE INCON ASSESS: CPT | Performed by: INTERNAL MEDICINE

## 2024-02-29 PROCEDURE — 1123F ACP DISCUSS/DSCN MKR DOCD: CPT | Performed by: INTERNAL MEDICINE

## 2024-02-29 ASSESSMENT — PATIENT HEALTH QUESTIONNAIRE - PHQ9
1. LITTLE INTEREST OR PLEASURE IN DOING THINGS: 0
2. FEELING DOWN, DEPRESSED OR HOPELESS: 0
SUM OF ALL RESPONSES TO PHQ QUESTIONS 1-9: 0
SUM OF ALL RESPONSES TO PHQ9 QUESTIONS 1 & 2: 0

## 2024-02-29 NOTE — PROGRESS NOTES
Marixa Lim    Referred for dizziness, SOB, palpitations.    HPI    Marixa Lim is a 73 y.o. female referred from \[facility]\"" internal medicine for persistent symptoms of vertigo, abnormal EKG and palpitations.    This patient has no known significant cardiac history but as you know had at least a moderate to severe case of COVID infection in August 2020.  She said her symptoms were profoundly GI she lost her taste and smell had diarrhea and became severely dehydrated even needing to go to the ER for fluids.  Since her illness she was a COVID long-hauler with persistent symptoms of fatigue and actually still has not regained her taste and smell.    She was at her new baseline state of health when May 2023 she apparently got a UTI on the ninth.  She says she started on antibiotics and 2 days later had an episode of severe vertigo.  She assumed that it was from the antibiotics so the antibiotic was apparently changed but unfortunately the vertigo continued and recurred again.  Presented back for follow-up this resulted in ENT referral cardiology referral and an MRI of her brain.    She is  from her ex- and they lived apart for 3 yrs but he more recently moved back into the house as more a tenant. She has 3 kids who support her but she does all her ADLs etc.      Current Outpatient Medications   Medication Sig Dispense Refill    methenamine (HIPREX) 1 g tablet       triamcinolone (KENALOG) 0.1 % cream Apply topically 2 times daily      rosuvastatin (CRESTOR) 20 MG tablet Take 1 tablet by mouth at bedtime      ondansetron (ZOFRAN) 4 MG tablet Take 1 tablet by mouth every 8 hours as needed      losartan (COZAAR) 50 MG tablet Take 1 tablet by mouth daily      levothyroxine (SYNTHROID) 25 MCG tablet Take 1 tablet by mouth Daily      hydroCHLOROthiazide (HYDRODIURIL) 25 MG tablet Take 1 tablet by mouth daily      fluticasone (FLONASE) 50 MCG/ACT nasal spray 1 spray by Nasal route daily

## 2024-02-29 NOTE — PROGRESS NOTES
Marixa A Raphael presents today for   Chief Complaint   Patient presents with    Follow-up     6 month f/u     Palpitations     Fluttering palps when lays down        Marixa Lim preferred language for health care discussion is english/other.    Is someone accompanying this pt? no    Is the patient using any DME equipment during OV? no    Depression Screening:  Depression: Not at risk (2/29/2024)    PHQ-2     PHQ-2 Score: 0        Learning Assessment:  Who is the primary learner? Patient    What is the preferred language for health care of the primary learner? ENGLISH    How does the primary learner prefer to learn new concepts? DEMONSTRATION    Answered By patient    Relationship to Learner SELF           Pt currently taking Anticoagulant therapy? no    Pt currently taking Antiplatelet therapy ? no      Coordination of Care:  1. Have you been to the ER, urgent care clinic since your last visit? Hospitalized since your last visit? no    2. Have you seen or consulted any other health care providers outside of the [facility] since your last visit? Include any pap smears or colon screening. no

## 2024-08-29 ENCOUNTER — OFFICE VISIT (OUTPATIENT)
Age: 74
End: 2024-08-29
Payer: MEDICARE

## 2024-08-29 VITALS
BODY MASS INDEX: 30.05 KG/M2 | WEIGHT: 176 LBS | SYSTOLIC BLOOD PRESSURE: 132 MMHG | DIASTOLIC BLOOD PRESSURE: 70 MMHG | OXYGEN SATURATION: 97 % | HEIGHT: 64 IN | HEART RATE: 84 BPM

## 2024-08-29 DIAGNOSIS — R06.02 SOB (SHORTNESS OF BREATH): ICD-10-CM

## 2024-08-29 DIAGNOSIS — R00.2 PALPITATIONS: Primary | ICD-10-CM

## 2024-08-29 DIAGNOSIS — R60.9 SWELLING: ICD-10-CM

## 2024-08-29 PROCEDURE — 93000 ELECTROCARDIOGRAM COMPLETE: CPT | Performed by: INTERNAL MEDICINE

## 2024-08-29 PROCEDURE — G8427 DOCREV CUR MEDS BY ELIG CLIN: HCPCS | Performed by: INTERNAL MEDICINE

## 2024-08-29 PROCEDURE — 1036F TOBACCO NON-USER: CPT | Performed by: INTERNAL MEDICINE

## 2024-08-29 PROCEDURE — G8400 PT W/DXA NO RESULTS DOC: HCPCS | Performed by: INTERNAL MEDICINE

## 2024-08-29 PROCEDURE — 1123F ACP DISCUSS/DSCN MKR DOCD: CPT | Performed by: INTERNAL MEDICINE

## 2024-08-29 PROCEDURE — 99215 OFFICE O/P EST HI 40 MIN: CPT | Performed by: INTERNAL MEDICINE

## 2024-08-29 PROCEDURE — 1090F PRES/ABSN URINE INCON ASSESS: CPT | Performed by: INTERNAL MEDICINE

## 2024-08-29 PROCEDURE — G8417 CALC BMI ABV UP PARAM F/U: HCPCS | Performed by: INTERNAL MEDICINE

## 2024-08-29 PROCEDURE — 3017F COLORECTAL CA SCREEN DOC REV: CPT | Performed by: INTERNAL MEDICINE

## 2024-08-29 RX ORDER — CONJUGATED ESTROGENS 0.62 MG/1
0.62 TABLET, FILM COATED ORAL DAILY
COMMUNITY
Start: 2024-08-26

## 2024-08-29 RX ORDER — CONJUGATED ESTROGENS 0.62 MG/G
0.5 CREAM VAGINAL DAILY
COMMUNITY

## 2024-08-29 ASSESSMENT — PATIENT HEALTH QUESTIONNAIRE - PHQ9
SUM OF ALL RESPONSES TO PHQ QUESTIONS 1-9: 0
1. LITTLE INTEREST OR PLEASURE IN DOING THINGS: NOT AT ALL
SUM OF ALL RESPONSES TO PHQ QUESTIONS 1-9: 0
2. FEELING DOWN, DEPRESSED OR HOPELESS: NOT AT ALL
SUM OF ALL RESPONSES TO PHQ QUESTIONS 1-9: 0
SUM OF ALL RESPONSES TO PHQ QUESTIONS 1-9: 0
SUM OF ALL RESPONSES TO PHQ9 QUESTIONS 1 & 2: 0

## 2024-08-29 NOTE — PROGRESS NOTES
MCG tablet Take 1 tablet by mouth Daily      hydroCHLOROthiazide (HYDRODIURIL) 25 MG tablet Take 1 tablet by mouth daily      fluticasone (FLONASE) 50 MCG/ACT nasal spray 1 spray by Nasal route daily      fexofenadine (ALLEGRA) 180 MG tablet Take 1 tablet by mouth daily       No current facility-administered medications for this visit.       Allergies   Allergen Reactions    Penicillins Hives    Prochlorperazine Edisylate Other (See Comments)     Per patient- paralyzed, could not breathe    Sulfa Antibiotics Other (See Comments)     Lowered white blood cell count       Social History     Socioeconomic History    Marital status:      Spouse name: Not on file    Number of children: Not on file    Years of education: Not on file    Highest education level: Not on file   Occupational History    Not on file   Tobacco Use    Smoking status: Never     Passive exposure: Never    Smokeless tobacco: Never   Substance and Sexual Activity    Alcohol use: Not Currently     Comment: Only holidays-1glass of wine    Drug use: Never    Sexual activity: Not Currently     Partners: Male   Other Topics Concern    Not on file   Social History Narrative    Not on file     Social Determinants of Health     Financial Resource Strain: Not on file   Food Insecurity: Not on file   Transportation Needs: Not on file   Physical Activity: Not on file   Stress: Not on file   Social Connections: Not on file   Intimate Partner Violence: Not on file   Housing Stability: Not on file    Friends with Marixa De La Torre    FH: neg    Review of Systems    14 pt Review of Systems is negative unless otherwise mentioned in the HPI.    Wt Readings from Last 3 Encounters:   08/29/24 79.8 kg (176 lb)   02/29/24 80.3 kg (177 lb)   11/03/23 79.4 kg (175 lb)     Temp Readings from Last 3 Encounters:   07/28/23 97.6 °F (36.4 °C) (Oral)     BP Readings from Last 3 Encounters:   08/29/24 132/70   02/29/24 136/72   11/03/23 138/68     Pulse Readings from Last 3  of your patient, please do not hesitate to call with questions or concerns.    Regards,    Chanel Piedra, DO

## 2024-08-29 NOTE — PROGRESS NOTES
Marixa Lim presents today for   Chief Complaint   Patient presents with    Follow-up     6 month f/u     Palpitations     Fluttering palps at times     Edema     Rt foot edema at night at times        Marixa Lim preferred language for health care discussion is english/other.    Is someone accompanying this pt? no    Is the patient using any DME equipment during OV? no    Depression Screening:  Depression: Not at risk (8/29/2024)    PHQ-2     PHQ-2 Score: 0        Learning Assessment:  Who is the primary learner? Patient    What is the preferred language for health care of the primary learner? ENGLISH    How does the primary learner prefer to learn new concepts? DEMONSTRATION    Answered By patient    Relationship to Learner SELF           Pt currently taking Anticoagulant therapy? no    Pt currently taking Antiplatelet therapy ? no      Coordination of Care:  1. Have you been to the ER, urgent care clinic since your last visit? Hospitalized since your last visit? no    2. Have you seen or consulted any other health care providers outside of the [facility] since your last visit? Include any pap smears or colon screening. no

## 2025-08-28 ENCOUNTER — OFFICE VISIT (OUTPATIENT)
Age: 75
End: 2025-08-28
Payer: MEDICARE

## 2025-08-28 VITALS
HEART RATE: 82 BPM | WEIGHT: 177 LBS | DIASTOLIC BLOOD PRESSURE: 80 MMHG | BODY MASS INDEX: 30.22 KG/M2 | SYSTOLIC BLOOD PRESSURE: 136 MMHG | HEIGHT: 64 IN | OXYGEN SATURATION: 97 %

## 2025-08-28 DIAGNOSIS — R06.02 SOB (SHORTNESS OF BREATH): ICD-10-CM

## 2025-08-28 DIAGNOSIS — R60.9 SWELLING: ICD-10-CM

## 2025-08-28 DIAGNOSIS — R00.2 PALPITATIONS: Primary | ICD-10-CM

## 2025-08-28 PROCEDURE — 3017F COLORECTAL CA SCREEN DOC REV: CPT | Performed by: INTERNAL MEDICINE

## 2025-08-28 PROCEDURE — G8417 CALC BMI ABV UP PARAM F/U: HCPCS | Performed by: INTERNAL MEDICINE

## 2025-08-28 PROCEDURE — 1160F RVW MEDS BY RX/DR IN RCRD: CPT | Performed by: INTERNAL MEDICINE

## 2025-08-28 PROCEDURE — 1090F PRES/ABSN URINE INCON ASSESS: CPT | Performed by: INTERNAL MEDICINE

## 2025-08-28 PROCEDURE — 1159F MED LIST DOCD IN RCRD: CPT | Performed by: INTERNAL MEDICINE

## 2025-08-28 PROCEDURE — 1123F ACP DISCUSS/DSCN MKR DOCD: CPT | Performed by: INTERNAL MEDICINE

## 2025-08-28 PROCEDURE — 93000 ELECTROCARDIOGRAM COMPLETE: CPT | Performed by: INTERNAL MEDICINE

## 2025-08-28 PROCEDURE — 99215 OFFICE O/P EST HI 40 MIN: CPT | Performed by: INTERNAL MEDICINE

## 2025-08-28 PROCEDURE — G8427 DOCREV CUR MEDS BY ELIG CLIN: HCPCS | Performed by: INTERNAL MEDICINE

## 2025-08-28 PROCEDURE — G8400 PT W/DXA NO RESULTS DOC: HCPCS | Performed by: INTERNAL MEDICINE

## 2025-08-28 PROCEDURE — 1036F TOBACCO NON-USER: CPT | Performed by: INTERNAL MEDICINE

## 2025-08-28 PROCEDURE — 1126F AMNT PAIN NOTED NONE PRSNT: CPT | Performed by: INTERNAL MEDICINE

## 2025-08-28 RX ORDER — ESTRADIOL 0.1 MG/G
CREAM VAGINAL DAILY
COMMUNITY
Start: 2025-05-14